# Patient Record
Sex: MALE | Race: WHITE | NOT HISPANIC OR LATINO | URBAN - METROPOLITAN AREA
[De-identification: names, ages, dates, MRNs, and addresses within clinical notes are randomized per-mention and may not be internally consistent; named-entity substitution may affect disease eponyms.]

---

## 2019-11-05 ENCOUNTER — OFFICE VISIT (OUTPATIENT)
Dept: UROLOGY | Facility: CLINIC | Age: 74
End: 2019-11-05
Payer: MEDICARE

## 2019-11-05 VITALS
HEART RATE: 69 BPM | BODY MASS INDEX: 26.51 KG/M2 | SYSTOLIC BLOOD PRESSURE: 119 MMHG | WEIGHT: 179 LBS | DIASTOLIC BLOOD PRESSURE: 60 MMHG | RESPIRATION RATE: 16 BRPM | HEIGHT: 69 IN

## 2019-11-05 DIAGNOSIS — N40.1 BPH WITH OBSTRUCTION/LOWER URINARY TRACT SYMPTOMS: ICD-10-CM

## 2019-11-05 DIAGNOSIS — N13.8 BPH WITH OBSTRUCTION/LOWER URINARY TRACT SYMPTOMS: ICD-10-CM

## 2019-11-05 LAB — PSA, DIAGNOSTIC: 0.18 NG/ML (ref 0.1–4)

## 2019-11-05 PROCEDURE — 99213 PR OFFICE/OUTPT VISIT, EST, LEVL III, 20-29 MIN: ICD-10-PCS | Mod: 25,S$GLB,, | Performed by: NURSE PRACTITIONER

## 2019-11-05 PROCEDURE — 99213 OFFICE O/P EST LOW 20 MIN: CPT | Mod: 25,S$GLB,, | Performed by: NURSE PRACTITIONER

## 2019-11-05 PROCEDURE — 81002 URINALYSIS NONAUTO W/O SCOPE: CPT | Mod: S$GLB,,, | Performed by: NURSE PRACTITIONER

## 2019-11-05 PROCEDURE — 81002 POCT URINE DIPSTICK WITHOUT MICROSCOPE: ICD-10-PCS | Mod: S$GLB,,, | Performed by: NURSE PRACTITIONER

## 2019-11-05 RX ORDER — CLOPIDOGREL BISULFATE 75 MG/1
75 TABLET ORAL DAILY
COMMUNITY

## 2019-11-05 RX ORDER — TIZANIDINE 4 MG/1
TABLET ORAL
Refills: 0 | COMMUNITY
Start: 2019-09-27

## 2019-11-05 RX ORDER — ASPIRIN 325 MG
325 TABLET ORAL NIGHTLY
COMMUNITY

## 2019-11-05 RX ORDER — LOSARTAN POTASSIUM 100 MG/1
160 TABLET ORAL DAILY
COMMUNITY

## 2019-11-05 RX ORDER — ALLOPURINOL 100 MG/1
100 TABLET ORAL DAILY
Refills: 5 | COMMUNITY
Start: 2019-08-20

## 2019-11-06 LAB
BILIRUB SERPL-MCNC: NORMAL MG/DL
BLOOD URINE, POC: NORMAL
COLOR, POC UA: YELLOW
GLUCOSE UR QL STRIP: NORMAL
KETONES UR QL STRIP: NORMAL
LEUKOCYTE ESTERASE URINE, POC: NORMAL
NITRITE, POC UA: NORMAL
PH, POC UA: 6
PROTEIN, POC: NORMAL
SPECIFIC GRAVITY, POC UA: 1.02
UROBILINOGEN, POC UA: 1

## 2020-11-02 ENCOUNTER — OFFICE VISIT (OUTPATIENT)
Dept: UROLOGY | Facility: CLINIC | Age: 75
End: 2020-11-02
Payer: MEDICARE

## 2020-11-02 VITALS
WEIGHT: 175 LBS | HEART RATE: 72 BPM | HEIGHT: 69 IN | DIASTOLIC BLOOD PRESSURE: 77 MMHG | SYSTOLIC BLOOD PRESSURE: 124 MMHG | BODY MASS INDEX: 25.92 KG/M2 | RESPIRATION RATE: 18 BRPM

## 2020-11-02 DIAGNOSIS — N13.8 BPH WITH OBSTRUCTION/LOWER URINARY TRACT SYMPTOMS: Primary | ICD-10-CM

## 2020-11-02 DIAGNOSIS — N40.1 BPH WITH OBSTRUCTION/LOWER URINARY TRACT SYMPTOMS: Primary | ICD-10-CM

## 2020-11-02 LAB — POC RESIDUAL URINE VOLUME: 0 ML (ref 0–100)

## 2020-11-02 PROCEDURE — 99213 OFFICE O/P EST LOW 20 MIN: CPT | Mod: S$GLB,,, | Performed by: NURSE PRACTITIONER

## 2020-11-02 PROCEDURE — 99213 PR OFFICE/OUTPT VISIT, EST, LEVL III, 20-29 MIN: ICD-10-PCS | Mod: S$GLB,,, | Performed by: NURSE PRACTITIONER

## 2020-11-02 PROCEDURE — 51798 US URINE CAPACITY MEASURE: CPT | Mod: S$GLB,,, | Performed by: NURSE PRACTITIONER

## 2020-11-02 PROCEDURE — 51798 POCT BLADDER SCAN: ICD-10-PCS | Mod: S$GLB,,, | Performed by: NURSE PRACTITIONER

## 2020-11-02 NOTE — PROGRESS NOTES
Subjective:       Patient ID: Kee Milligan is a 75 y.o. male.    Chief Complaint: Yrly and BPH with Obs      HPI: 75-year-old male known to service Dr. 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  Last PSA 2019 was 0.1.  He continues to void without complaint.  Denying dysuria, frequency, urgency, incontinence or gross hematuria.  No flank pain.  He has had no UTIs over the last 12 months.  Erections are not an issue.  No other urologic complaints       Past Medical History:   Past Medical History:   Diagnosis Date    HTN (hypertension)     Hyperlipemia     MI (myocardial infarction)     2001       Past Surgical Historical:   Past Surgical History:   Procedure Laterality Date    BACK SURGERY          Medications:   Medication List with Changes/Refills   Current Medications    ALLOPURINOL (ZYLOPRIM) 100 MG TABLET    Take 100 mg by mouth once daily.    ASPIRIN 325 MG TABLET    Take 325 mg by mouth every evening.    CLOPIDOGREL (PLAVIX) 75 MG TABLET    Take 75 mg by mouth once daily.    LOSARTAN (COZAAR) 100 MG TABLET    Take 160 mg by mouth once daily.    MULTIVITAMIN WITH MINERALS TABLET    Take 1 tablet by mouth once daily.    TIZANIDINE (ZANAFLEX) 4 MG TABLET    TK 1 T PO Q 8 H AS NEEDED. NTE 3 DOSES IN 24 H        Past Social History:   Social History     Socioeconomic History    Marital status:      Spouse name: Not on file    Number of children: Not on file    Years of education: Not on file    Highest education level: Not on file   Occupational History    Not on file   Social Needs    Financial resource strain: Not on file    Food insecurity     Worry: Not on file     Inability: Not on file    Transportation needs     Medical: Not on file     Non-medical: Not on file   Tobacco Use    Smoking status: Never Smoker    Smokeless tobacco: Never Used   Substance and Sexual Activity    Alcohol use: Yes     Comment: occasional    Drug use: Never    Sexual activity: Not on file   Lifestyle     Physical activity     Days per week: Not on file     Minutes per session: Not on file    Stress: Not on file   Relationships    Social connections     Talks on phone: Not on file     Gets together: Not on file     Attends Sikh service: Not on file     Active member of club or organization: Not on file     Attends meetings of clubs or organizations: Not on file     Relationship status: Not on file   Other Topics Concern    Not on file   Social History Narrative    Not on file       Allergies:   Review of patient's allergies indicates:   Allergen Reactions    Pcn [penicillins]         Family History: History reviewed. No pertinent family history.     Review of Systems:  Review of Systems   Constitutional: Negative for activity change and appetite change.   HENT: Negative for congestion and dental problem.    Respiratory: Negative for chest tightness and shortness of breath.    Cardiovascular: Negative for chest pain.   Gastrointestinal: Negative for abdominal distention and abdominal pain.   Genitourinary: Negative for decreased urine volume, difficulty urinating, discharge, dysuria, enuresis, flank pain, frequency, genital sores, hematuria, penile pain, penile swelling, scrotal swelling, testicular pain and urgency.   Musculoskeletal: Negative for back pain and neck pain.   Neurological: Negative for dizziness.   Hematological: Negative for adenopathy.   Psychiatric/Behavioral: Negative for agitation, behavioral problems and confusion.       Physical Exam:  Physical Exam  Constitutional:       Appearance: He is well-developed.   HENT:      Head: Normocephalic.   Eyes:      General: No scleral icterus.  Neck:      Musculoskeletal: Normal range of motion.   Pulmonary:      Effort: Pulmonary effort is normal.      Breath sounds: Normal breath sounds.   Abdominal:      General: There is no distension.      Palpations: Abdomen is soft.      Tenderness: There is no abdominal tenderness.      Hernia: No hernia  is present. There is no hernia in the right inguinal area or left inguinal area.   Genitourinary:     Penis: Normal.       Scrotum/Testes: Normal. Cremasteric reflex is present.      Comments: MARIANA--prostate is approximately 2 finger breath, slightly asymmetrical left greater than right with no nodule induration or tenderness  Skin:     General: Skin is warm and dry.   Neurological:      Mental Status: He is alert and oriented to person, place, and time.         Assessment/Plan:   BPH without obstruction--serum PSA today return to clinic 1 year pending stability of lab results   Problem List Items Addressed This Visit        Renal/    BPH with obstruction/lower urinary tract symptoms - Primary    Relevant Orders    POCT Bladder Scan    Prostate Specific Antigen, Diagnostic

## 2020-11-03 ENCOUNTER — TELEPHONE (OUTPATIENT)
Dept: UROLOGY | Facility: CLINIC | Age: 75
End: 2020-11-03

## 2020-11-04 ENCOUNTER — TELEPHONE (OUTPATIENT)
Dept: UROLOGY | Facility: CLINIC | Age: 75
End: 2020-11-04

## 2020-11-04 NOTE — TELEPHONE ENCOUNTER
Pt requesting copy of PSA results from 11-02-20 and 2014 mailed to him.  2014 records have already been requested from off site archived records.  Informed pt we will mail both results as soon as we get archived records.

## 2020-11-04 NOTE — TELEPHONE ENCOUNTER
----- Message from Antoinette Brandon sent at 11/4/2020 10:06 AM CST -----  Regarding: PSA report  Good morning,      Pt would like PSA report from yesterdays visit as well as his 2014 visit. Pt  would like a call back at [phone]        Thanks,  Antoinette Brandon

## 2020-11-04 NOTE — TELEPHONE ENCOUNTER
----- Message from Antoinette Brandon sent at 11/4/2020 10:06 AM CST -----  Regarding: PSA report  Good morning,      Pt would like PSA report from yesterdays visit as well as his 2014 visit. Pt  would like a call back at [phone]        Thanks,  Antoinette Brandon

## 2020-11-12 ENCOUNTER — TELEPHONE (OUTPATIENT)
Dept: UROLOGY | Facility: CLINIC | Age: 75
End: 2020-11-12

## 2021-01-13 ENCOUNTER — TELEPHONE (OUTPATIENT)
Dept: UROLOGY | Facility: CLINIC | Age: 76
End: 2021-01-13

## 2023-02-15 ENCOUNTER — TELEPHONE (OUTPATIENT)
Dept: GASTROENTEROLOGY | Facility: CLINIC | Age: 78
End: 2023-02-15
Payer: MEDICARE

## 2023-02-15 DIAGNOSIS — Z12.11 SCREENING FOR COLON CANCER: ICD-10-CM

## 2023-02-15 DIAGNOSIS — Z86.010 PERSONAL HISTORY OF COLONIC POLYPS: ICD-10-CM

## 2023-02-15 DIAGNOSIS — R13.10 DYSPHAGIA, UNSPECIFIED TYPE: Primary | ICD-10-CM

## 2023-02-16 VITALS — WEIGHT: 175 LBS | BODY MASS INDEX: 25.92 KG/M2 | HEIGHT: 69 IN

## 2023-02-16 RX ORDER — POLYETHYLENE GLYCOL 3350, SODIUM SULFATE ANHYDROUS, SODIUM BICARBONATE, SODIUM CHLORIDE, POTASSIUM CHLORIDE 236; 22.74; 6.74; 5.86; 2.97 G/4L; G/4L; G/4L; G/4L; G/4L
4 POWDER, FOR SOLUTION ORAL ONCE
Qty: 4000 ML | Refills: 0 | Status: SHIPPED | OUTPATIENT
Start: 2023-02-16 | End: 2023-02-16

## 2023-02-16 NOTE — TELEPHONE ENCOUNTER
Contacted patient to schedule EGD/Colonoscopy @ [facility] with Dr Rust on 4/20/23.  BMI is 25.84.  Patient is taking Plavix 75 mg.  Patient had a Angioplasty in 2001 and has Coronary Artery Disease.  CPAP setting is 3.  Patient has Medicare and is requesting Golytely.  I discussed the prep instructions and patient requested that I mail the instructions to him.      Patient was told to hold aspirin and Plavix 5-7 days before the procedure.  His Cardiologist is Dr Marcelo Nieves.    Daljit

## 2023-02-16 NOTE — TELEPHONE ENCOUNTER
"[facility]  [address] [place] [address]  Phone: [phone]  Fax: [phone]    History & Physical         Provider: Dr. Stephan Rust    Patient Name: Kee WHALEY (age):1945  77 y.o.           Gender: male   Phone: [phone]     Referring Physician: Adeel Whitaker     Vital Signs:   Height - 5' 9"  Weight - 175 lbs  BMI - 25.84     Plan: EGD/Colonoscopy    Encounter Diagnoses   Name Primary?    Dysphagia, unspecified type Yes    Personal history of colonic polyps     Screening for colon cancer            History:      Past Medical History:   Diagnosis Date    BMI 25.0-25.9,adult     Coronary artery disease     Dysphagia     Gout, unspecified     HTN (hypertension)     Hyperlipemia     MI (myocardial infarction)     Personal history of colonic polyps     Sleep apnea, unspecified     Setting 3      Past Surgical History:   Procedure Laterality Date    ANGIOPLASTY      BACK SURGERY      Lumbar x2    COLONOSCOPY W/ POLYPECTOMY  2021    CYST REMOVAL      From Back    PROSTATE SURGERY      UPPER GASTROINTESTINAL ENDOSCOPY  2021      Medication List with Changes/Refills   New Medications    POLYETHYLENE GLYCOL (GOLYTELY) 236-22.74-6.74 -5.86 GRAM SUSPENSION    Take 4,000 mLs (4 L total) by mouth once. for 1 dose   Current Medications    ALLOPURINOL (ZYLOPRIM) 100 MG TABLET    Take 100 mg by mouth once daily.    ASPIRIN 325 MG TABLET    Take 325 mg by mouth every evening.    CLOPIDOGREL (PLAVIX) 75 MG TABLET    Take 75 mg by mouth once daily.    LOSARTAN (COZAAR) 100 MG TABLET    Take 160 mg by mouth once daily.    MULTIVITAMIN WITH MINERALS TABLET    Take 1 tablet by mouth once daily.    TIZANIDINE (ZANAFLEX) 4 MG TABLET    TK 1 T PO Q 8 H AS NEEDED. NTE 3 DOSES IN 24 H      Review of patient's allergies indicates:   Allergen Reactions    Pcn [penicillins]     "   Family History   Problem Relation Age of Onset    Dementia Mother     Heart attack Father     Breast cancer Sister         In Remission    No Known Problems Maternal Grandmother     No Known Problems Maternal Grandfather     No Known Problems Paternal Grandmother     No Known Problems Paternal Grandfather       Social History     Tobacco Use    Smoking status: Never    Smokeless tobacco: Never   Substance Use Topics    Alcohol use: Yes     Comment: occasional    Drug use: Never        Physical Examination:     General Appearance:___________________________  HEENT: _____________________________________  Abdomen:____________________________________  Heart:________________________________________  Lungs:_______________________________________  Extremities:___________________________________  Skin:_________________________________________  Endocrine:____________________________________  Genitourinary:_________________________________  Neurological:__________________________________      Patient has been evaluated immediately prior to sedation and is medically cleared for endoscopy with IVCS as an ASA class: ______      Physician Signature: _________________________       Date: ________  Time: ________

## 2023-02-23 NOTE — TELEPHONE ENCOUNTER
Contacted patient and told patient we received the cardiac clearance from 's office and told him to hold the aspirin and Plavix 5-7 days prior to the procedure.  I also mailed the prep instructions again on 2/24/23 to patient stating to hold the aspirin and Plavix 5-7 days prior to procedure.  Daljit

## 2023-02-23 NOTE — TELEPHONE ENCOUNTER
Rec'd cardiac clearance, ECG, and last OV notes from 's office 2/15/23. Hold ASA/Plavix 5-7 days prior to the procedure.       Scanned into media and routed to RMKEVIN and KAELYN. di

## 2023-04-17 ENCOUNTER — TELEPHONE (OUTPATIENT)
Dept: GASTROENTEROLOGY | Facility: CLINIC | Age: 78
End: 2023-04-17
Payer: MEDICARE

## 2023-04-17 DIAGNOSIS — Z86.010 PERSONAL HISTORY OF COLONIC POLYPS: ICD-10-CM

## 2023-04-17 DIAGNOSIS — Z12.11 SCREENING FOR COLON CANCER: ICD-10-CM

## 2023-04-17 DIAGNOSIS — R13.10 DYSPHAGIA, UNSPECIFIED TYPE: Primary | ICD-10-CM

## 2023-04-17 NOTE — TELEPHONE ENCOUNTER
"[facility]  [address] [place] [address]  Phone: [phone]  Fax: [phone]    History & Physical         Provider: Dr. Stephan Rust    Patient Name: Kee WHALEY (age):1945  77 y.o.           Gender: male   Phone: [phone]     Referring Physician: Adeel Whitaker     Vital Signs:   Height - 5' 9"  Weight - 175 lbs  BMI -  25.84    Plan: EGD / Colonoscopy    Encounter Diagnoses   Name Primary?    Dysphagia, unspecified type Yes    Personal history of colonic polyps     Screening for colon cancer            History:      Past Medical History:   Diagnosis Date    BMI 25.0-25.9,adult     Coronary artery disease     Dysphagia     Gout, unspecified     HTN (hypertension)     Hyperlipemia     MI (myocardial infarction)     Personal history of colonic polyps     Sleep apnea, unspecified     Setting 3      Past Surgical History:   Procedure Laterality Date    ANGIOPLASTY      BACK SURGERY      Lumbar x2    COLONOSCOPY W/ POLYPECTOMY  2021    CYST REMOVAL      From Back    PROSTATE SURGERY      UPPER GASTROINTESTINAL ENDOSCOPY  2021      Medication List with Changes/Refills   Current Medications    ALLOPURINOL (ZYLOPRIM) 100 MG TABLET    Take 100 mg by mouth once daily.    ASPIRIN 325 MG TABLET    Take 325 mg by mouth every evening.    CLOPIDOGREL (PLAVIX) 75 MG TABLET    Take 75 mg by mouth once daily.    LOSARTAN (COZAAR) 100 MG TABLET    Take 160 mg by mouth once daily.    MULTIVITAMIN WITH MINERALS TABLET    Take 1 tablet by mouth once daily.    TIZANIDINE (ZANAFLEX) 4 MG TABLET    TK 1 T PO Q 8 H AS NEEDED. NTE 3 DOSES IN 24 H      Review of patient's allergies indicates:   Allergen Reactions    Pcn [penicillins]       Family History   Problem Relation Age of Onset    Dementia Mother     Heart attack Father     Breast cancer Sister         In Remission    No Known " Problems Maternal Grandmother     No Known Problems Maternal Grandfather     No Known Problems Paternal Grandmother     No Known Problems Paternal Grandfather       Social History     Tobacco Use    Smoking status: Never    Smokeless tobacco: Never   Substance Use Topics    Alcohol use: Yes     Comment: occasional    Drug use: Never        Physical Examination:     General Appearance:___________________________  HEENT: _____________________________________  Abdomen:____________________________________  Heart:________________________________________  Lungs:_______________________________________  Extremities:___________________________________  Skin:_________________________________________  Endocrine:____________________________________  Genitourinary:_________________________________  Neurological:__________________________________      Patient has been evaluated immediately prior to sedation and is medically cleared for endoscopy with IVCS as an ASA class: ______      Physician Signature: _________________________       Date: ________  Time: ________

## 2023-04-18 ENCOUNTER — TELEPHONE (OUTPATIENT)
Dept: GASTROENTEROLOGY | Facility: CLINIC | Age: 78
End: 2023-04-18
Payer: MEDICARE

## 2023-04-18 NOTE — TELEPHONE ENCOUNTER
----- Message from Stacey Rodriguez sent at 4/18/2023  2:40 PM CDT -----  Contact: Pt  Is calling rg the instructions for his procedure and states that they are confusing and can be reached at [phone]/pls call today/thanks/dbw

## 2023-04-18 NOTE — TELEPHONE ENCOUNTER
Returned call to pt and he understood the instructions as we went over the instructions on the phone

## 2023-04-20 ENCOUNTER — OUTSIDE PLACE OF SERVICE (OUTPATIENT)
Dept: GASTROENTEROLOGY | Facility: CLINIC | Age: 78
End: 2023-04-20

## 2023-04-20 ENCOUNTER — OUTSIDE PLACE OF SERVICE (OUTPATIENT)
Dept: GASTROENTEROLOGY | Facility: CLINIC | Age: 78
End: 2023-04-20
Payer: MEDICARE

## 2023-04-20 LAB
CRC RECOMMENDATION EXT: NORMAL
EGD FOLLOW UP EXTERNAL: NORMAL

## 2023-04-20 PROCEDURE — G0105 COLORECTAL SCRN; HI RISK IND: HCPCS | Mod: ,,, | Performed by: INTERNAL MEDICINE

## 2023-04-20 PROCEDURE — 43248 PR EGD, FLEX, W/DILATION OVER GUIDEWIRE: ICD-10-PCS | Mod: ,,, | Performed by: INTERNAL MEDICINE

## 2023-04-20 PROCEDURE — 43239 EGD BIOPSY SINGLE/MULTIPLE: CPT | Mod: 59,,, | Performed by: INTERNAL MEDICINE

## 2023-04-20 PROCEDURE — G0105 COLORECTAL SCRN; HI RISK IND: ICD-10-PCS | Mod: ,,, | Performed by: INTERNAL MEDICINE

## 2023-04-20 PROCEDURE — 43248 EGD GUIDE WIRE INSERTION: CPT | Mod: ,,, | Performed by: INTERNAL MEDICINE

## 2023-04-20 PROCEDURE — 43239 PR EGD, FLEX, W/BIOPSY, SGL/MULTI: ICD-10-PCS | Mod: 59,,, | Performed by: INTERNAL MEDICINE

## 2023-04-21 LAB — SPECIMEN TO PATHOLOGY: NORMAL

## 2023-04-25 ENCOUNTER — TELEPHONE (OUTPATIENT)
Dept: GASTROENTEROLOGY | Facility: CLINIC | Age: 78
End: 2023-04-25
Payer: MEDICARE

## 2023-04-25 NOTE — TELEPHONE ENCOUNTER
----- Message from Stephan Rust MD sent at 4/24/2023  7:05 AM CDT -----  Call with results, small amount of Barretts was detected-repeat EGD in 1 yr.

## 2023-04-25 NOTE — TELEPHONE ENCOUNTER
I spoke to patient and gave results of EGD.  He is C/O hoarseness and feels like SOB when he starts to talk since he has had the procedure.

## 2023-04-26 NOTE — TELEPHONE ENCOUNTER
I spoke to patient and he came in the clinic and I spoke to him and explained about the procedure.  He will call if not better in 10 days.

## 2023-04-28 ENCOUNTER — TELEPHONE (OUTPATIENT)
Dept: GASTROENTEROLOGY | Facility: CLINIC | Age: 78
End: 2023-04-28

## 2023-04-28 NOTE — TELEPHONE ENCOUNTER
----- Message from Dorothy Chavarria sent at 4/28/2023 10:52 AM CDT -----  Patient is calling in regards to would like results sent to his PCP...Please call him back at [phone]

## 2023-04-28 NOTE — TELEPHONE ENCOUNTER
I spoke to patient and he will come by next week to sign a KEILA.  He also is C/O since his EGD last week is having problems talking and when talking get SOB.  Has a little discomfort  eating also.  Wants to know what to do?

## 2023-05-01 ENCOUNTER — DOCUMENTATION ONLY (OUTPATIENT)
Dept: GASTROENTEROLOGY | Facility: CLINIC | Age: 78
End: 2023-05-01
Payer: MEDICARE

## 2023-05-01 ENCOUNTER — TELEPHONE (OUTPATIENT)
Dept: GASTROENTEROLOGY | Facility: CLINIC | Age: 78
End: 2023-05-01
Payer: MEDICARE

## 2023-05-01 NOTE — TELEPHONE ENCOUNTER
----- Message from Dorothy Chavarria sent at 5/1/2023 12:10 PM CDT -----  Patient is requesting a call back due to can not talk since having procedure..Please call him back [phone]

## 2023-05-01 NOTE — TELEPHONE ENCOUNTER
"Patient states he has been hoarse since his procedure and he "runs out of air trying to talk". Please advise  "

## 2023-05-02 NOTE — TELEPHONE ENCOUNTER
Patient called a second time complaining of shortness of breath and hoarseness since his procecdure.

## 2023-05-02 NOTE — TELEPHONE ENCOUNTER
----- Message from Dorothy Chavarria sent at 5/2/2023  4:40 PM CDT -----  Patient is still waiting for a call back..[phone]

## 2023-05-03 RX ORDER — PANTOPRAZOLE SODIUM 40 MG/1
40 TABLET, DELAYED RELEASE ORAL DAILY
COMMUNITY

## 2023-05-03 NOTE — TELEPHONE ENCOUNTER
Spoke to patient. He reports hoarseness and SOB when talking. Feels he can't get deep breath. I recommended he go to ER or at least urgent care so imaging can be done. Reminded him to let UC or ER know that he had dilation and should not have barium etc. He is taking pantoprazole 40 mg daily. I also reviewed Mendez's esophagus diagnosis with him.   MLC

## 2023-05-09 DIAGNOSIS — R06.02 SOB (SHORTNESS OF BREATH): Primary | ICD-10-CM

## 2023-05-10 ENCOUNTER — TELEPHONE (OUTPATIENT)
Dept: GASTROENTEROLOGY | Facility: CLINIC | Age: 78
End: 2023-05-10
Payer: MEDICARE

## 2023-05-10 NOTE — TELEPHONE ENCOUNTER
I spoke to patient and still having problems since procedure 4/20, states runs out of breathe when speaking and difficulty speaking. .  States [facility] a [facility], is in Chart.

## 2023-05-10 NOTE — TELEPHONE ENCOUNTER
----- Message from Galilea Thornton sent at 5/10/2023  3:58 PM CDT -----  Contact: self  Type - Patient Follow Up     Patient is still experiencing some post op concerns. Please reach out to patient @ [phone] - thanks!

## 2023-05-12 NOTE — TELEPHONE ENCOUNTER
Per Dr Davis, patient needs to be fit in next week for an appt for problems with speech and running out of breathe after talking.  He had a procedure done 2 weeks ago.

## 2023-05-19 NOTE — PROGRESS NOTES
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 Diagnoses of Shortness of breath and Personal history of colonic polyps were also pertinent to this visit.    PCP: Adeel Whitaker       HPI:  This is a 77 y.o. male here for follow up. After EGD 4/20/2023, he reported hoarseness and SOB. He states he is still having increase in SOB  and has to "grasp for air" when talking since EGD with dilation. Went to  5/4/2023 had CXR- mild elevation of right hemidiaphragm otherwise normal. He is on pantoprazole 40mg daily. Dysphagia resolved after dilation    4/20/2023 EGD - lower esophageal ring at EG jxn- 48fr dilation bx + for focal intestinal metaplasia, small HH- repeat in 1 year for Mendez Esophagus.    Review of Systems   Constitutional:  Negative for diaphoresis, fatigue, fever and unexpected weight change.   HENT:  Negative for hearing loss, mouth sores, postnasal drip, sore throat and trouble swallowing.    Eyes:  Negative for pain, discharge and eye dryness.   Respiratory:  Negative for apnea, cough, choking, chest tightness, shortness of breath and wheezing.    Cardiovascular:  Negative for chest pain, palpitations and leg swelling.   Gastrointestinal:  Negative for abdominal distention, abdominal pain, anal bleeding, blood in stool, change in bowel habit, constipation, diarrhea, nausea, rectal pain, vomiting, reflux, fecal incontinence and change in bowel habit.   Genitourinary:  Negative for bladder incontinence, dysuria and hematuria.   Musculoskeletal:  Positive for back pain and joint swelling. Negative for arthralgias.   Integumentary:  Negative for color change and rash.   Allergic/Immunologic: Negative for environmental allergies and food allergies.   Neurological:  Negative for seizures and headaches.   Hematological:  Negative for adenopathy. Does not bruise/bleed easily.      Past Medical History:   Diagnosis Date    Mendez's esophagus     BMI " "25.0-25.9,adult     Coronary artery disease     Dysphagia     Gout, unspecified     HTN (hypertension)     Hyperlipemia     MI (myocardial infarction) 2001    Personal history of colonic polyps     Sleep apnea, unspecified     Setting 3     Past Surgical History:   Procedure Laterality Date    ANGIOPLASTY  2001    BACK SURGERY      Lumbar x2    COLONOSCOPY W/ POLYPECTOMY  09/27/2021    CYST REMOVAL      From Back    PROSTATE SURGERY      UPPER GASTROINTESTINAL ENDOSCOPY  09/27/2021     Family History   Problem Relation Age of Onset    Dementia Mother     Heart attack Father     Breast cancer Sister         In Remission    No Known Problems Maternal Grandmother     No Known Problems Maternal Grandfather     No Known Problems Paternal Grandmother     No Known Problems Paternal Grandfather      Social History     Tobacco Use    Smoking status: Never    Smokeless tobacco: Never   Substance Use Topics    Alcohol use: Yes     Comment: occasional    Drug use: Never     Review of patient's allergies indicates:   Allergen Reactions    Pcn [penicillins]         Medication List with Changes/Refills   Current Medications    ALLOPURINOL (ZYLOPRIM) 100 MG TABLET    Take 100 mg by mouth once daily.    ASPIRIN 325 MG TABLET    Take 325 mg by mouth every evening.    CLOPIDOGREL (PLAVIX) 75 MG TABLET    Take 75 mg by mouth once daily.    LOSARTAN (COZAAR) 100 MG TABLET    Take 160 mg by mouth once daily.    MULTIVITAMIN WITH MINERALS TABLET    Take 1 tablet by mouth once daily.    PANTOPRAZOLE (PROTONIX) 40 MG TABLET    Take 40 mg by mouth once daily.    TIZANIDINE (ZANAFLEX) 4 MG TABLET    TK 1 T PO Q 8 H AS NEEDED. NTE 3 DOSES IN 24 H         Vital Signs:  /80 (BP Location: Left arm, Patient Position: Sitting, BP Method: Large (Automatic))   Pulse 74   Ht 5' 8" (1.727 m)   Wt 83.9 kg (185 lb)   SpO2 98%   BMI 28.13 kg/m²         Physical Exam  Constitutional:       General: He is not in acute distress.     Appearance: " Normal appearance. He is well-developed. He is not ill-appearing or toxic-appearing.   HENT:      Head: Normocephalic and atraumatic.      Nose: Nose normal.      Mouth/Throat:      Mouth: Mucous membranes are moist.      Pharynx: Oropharynx is clear. No oropharyngeal exudate or posterior oropharyngeal erythema.   Eyes:      General: Lids are normal. No scleral icterus.        Right eye: No discharge.         Left eye: No discharge.      Conjunctiva/sclera: Conjunctivae normal.   Cardiovascular:      Rate and Rhythm: Normal rate and regular rhythm.      Pulses:           Radial pulses are 2+ on the right side and 2+ on the left side.   Pulmonary:      Effort: Pulmonary effort is normal. No respiratory distress.      Breath sounds: No stridor. No wheezing.   Abdominal:      General: Bowel sounds are normal. There is no distension.      Palpations: Abdomen is soft. There is no fluid wave, hepatomegaly, splenomegaly or mass.      Tenderness: There is no abdominal tenderness. There is no guarding or rebound.   Musculoskeletal:      Cervical back: Full passive range of motion without pain.   Lymphadenopathy:      Cervical: No cervical adenopathy.   Skin:     General: Skin is warm and dry.      Capillary Refill: Capillary refill takes less than 2 seconds.      Coloration: Skin is not cyanotic, jaundiced or pale.   Neurological:      General: No focal deficit present.      Mental Status: He is alert and oriented to person, place, and time.   Psychiatric:         Mood and Affect: Mood normal.         Behavior: Behavior is cooperative.          All of the data above and below has been reviewed by myself and any further interpretations will be reflected in the assessment and plan.   The data includes review of external notes, and independent interpretation of lab results, procedures, x-rays, and imaging reports.      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 Repeat EGD in 1 year  SOB- will refer to pulmonary for workup. Chest X-ray- mild elevation of Right pam diagram-otherwise normal     Recommendations:    Refer to Pulmonary    Continue with Pantoprazole 40mg daily      Risks, benefits, and alternatives of medical management, any associated procedures, and/or treatment discussed with the patient. Patient given opportunity to ask questions and voices understanding. Patient has elected to proceed with the recommended care modalities as discussed.    Follow up in about 6 months (around 11/22/2023).    Order summary:  No orders of the defined types were placed in this encounter.       Instructed patient to notify my office if they have not been contacted within two weeks after any procedures, submitting any samples (biopsies, blood, stool, urine, etc.) or after any imaging (X-ray, CT, MRI, etc.).     Stephan Rust MD    This document may have been created using a voice recognition transcribing system. Incorrect words or phrases may have been missed during proofreading. Please interpret accordingly or contact me for clarification.

## 2023-05-22 ENCOUNTER — OFFICE VISIT (OUTPATIENT)
Dept: GASTROENTEROLOGY | Facility: CLINIC | Age: 78
End: 2023-05-22
Payer: MEDICARE

## 2023-05-22 VITALS
SYSTOLIC BLOOD PRESSURE: 125 MMHG | HEART RATE: 74 BPM | OXYGEN SATURATION: 98 % | WEIGHT: 185 LBS | BODY MASS INDEX: 28.04 KG/M2 | HEIGHT: 68 IN | DIASTOLIC BLOOD PRESSURE: 80 MMHG

## 2023-05-22 DIAGNOSIS — Z86.010 PERSONAL HISTORY OF COLONIC POLYPS: ICD-10-CM

## 2023-05-22 DIAGNOSIS — K22.70 BARRETT'S ESOPHAGUS WITHOUT DYSPLASIA: Primary | ICD-10-CM

## 2023-05-22 DIAGNOSIS — R06.02 SHORTNESS OF BREATH: ICD-10-CM

## 2023-05-22 PROCEDURE — 99213 PR OFFICE/OUTPT VISIT, EST, LEVL III, 20-29 MIN: ICD-10-PCS | Mod: S$GLB,,, | Performed by: INTERNAL MEDICINE

## 2023-05-22 PROCEDURE — 99213 OFFICE O/P EST LOW 20 MIN: CPT | Mod: S$GLB,,, | Performed by: INTERNAL MEDICINE

## 2023-05-22 NOTE — PATIENT INSTRUCTIONS
Refer to Pulmonary for shortness of breath    Continue with Pantoprazole 40mg daily    Please notify my office if you have not been contacted within two weeks after any procedures, submitting any samples (biopsies, blood, stool, urine, etc.) or after any imaging (X-ray, CT, MRI, etc.).

## 2023-05-25 ENCOUNTER — TELEPHONE (OUTPATIENT)
Dept: PULMONOLOGY | Facility: CLINIC | Age: 78
End: 2023-05-25
Payer: MEDICARE

## 2023-05-25 NOTE — TELEPHONE ENCOUNTER
Spoke with patient rescheduled to 052/30/2023. LB  ----- Message from Cecily Malcolm sent at 5/25/2023  3:30 PM CDT -----  Contact: self  Type:  Sooner Apoointment Request    Caller is requesting a sooner appointment.  Caller declined first available appointment listed below.  Caller will not accept being placed on the waitlist and is requesting a message be sent to doctor.  Name of Caller:Kee Milligan  When is the first available appointment?10/2023  Symptoms:lungs issue  Would the patient rather a call back or a response via [facility]? Call back  Best Call Back Number:[phone]  Additional Information: shortness of breath continuous

## 2023-05-30 ENCOUNTER — CLINICAL SUPPORT (OUTPATIENT)
Dept: PULMONOLOGY | Facility: CLINIC | Age: 78
End: 2023-05-30
Payer: MEDICARE

## 2023-05-30 ENCOUNTER — HOSPITAL ENCOUNTER (OUTPATIENT)
Dept: RADIOLOGY | Facility: CLINIC | Age: 78
Discharge: HOME | End: 2023-05-30
Attending: INTERNAL MEDICINE
Payer: MEDICARE

## 2023-05-30 ENCOUNTER — OFFICE VISIT (OUTPATIENT)
Dept: PULMONOLOGY | Facility: CLINIC | Age: 78
End: 2023-05-30
Payer: MEDICARE

## 2023-05-30 VITALS
SYSTOLIC BLOOD PRESSURE: 120 MMHG | OXYGEN SATURATION: 95 % | RESPIRATION RATE: 16 BRPM | BODY MASS INDEX: 26.52 KG/M2 | HEIGHT: 68 IN | DIASTOLIC BLOOD PRESSURE: 73 MMHG | WEIGHT: 175 LBS | HEART RATE: 79 BPM

## 2023-05-30 DIAGNOSIS — R06.02 SOB (SHORTNESS OF BREATH): ICD-10-CM

## 2023-05-30 DIAGNOSIS — R06.02 SHORTNESS OF BREATH: ICD-10-CM

## 2023-05-30 DIAGNOSIS — J98.6 DIAPHRAGM PARALYSIS: Primary | ICD-10-CM

## 2023-05-30 DIAGNOSIS — R06.02 SOB (SHORTNESS OF BREATH): Primary | ICD-10-CM

## 2023-05-30 DIAGNOSIS — R94.2 RESTRICTIVE PATTERN PRESENT ON PULMONARY FUNCTION TESTING: ICD-10-CM

## 2023-05-30 DIAGNOSIS — J98.6 ELEVATED DIAPHRAGM: Primary | ICD-10-CM

## 2023-05-30 PROCEDURE — 71046 XR CHEST PA AND LATERAL: ICD-10-PCS | Mod: 26,,, | Performed by: RADIOLOGY

## 2023-05-30 PROCEDURE — 71046 X-RAY EXAM CHEST 2 VIEWS: CPT | Mod: TC,,, | Performed by: INTERNAL MEDICINE

## 2023-05-30 PROCEDURE — 94726 PLETHYSMOGRAPHY LUNG VOLUMES: CPT | Mod: S$GLB,,, | Performed by: INTERNAL MEDICINE

## 2023-05-30 PROCEDURE — 94060 PR EVAL OF BRONCHOSPASM: ICD-10-PCS | Mod: S$GLB,,, | Performed by: INTERNAL MEDICINE

## 2023-05-30 PROCEDURE — 94060 EVALUATION OF WHEEZING: CPT | Mod: S$GLB,,, | Performed by: INTERNAL MEDICINE

## 2023-05-30 PROCEDURE — 94726 PULM FUNCT TST PLETHYSMOGRAP: ICD-10-PCS | Mod: S$GLB,,, | Performed by: INTERNAL MEDICINE

## 2023-05-30 PROCEDURE — 94729 PR C02/MEMBANE DIFFUSE CAPACITY: ICD-10-PCS | Mod: S$GLB,,, | Performed by: INTERNAL MEDICINE

## 2023-05-30 PROCEDURE — 99205 PR OFFICE/OUTPT VISIT, NEW, LEVL V, 60-74 MIN: ICD-10-PCS | Mod: 25,S$GLB,, | Performed by: INTERNAL MEDICINE

## 2023-05-30 PROCEDURE — 71046 XR CHEST PA AND LATERAL: ICD-10-PCS | Mod: TC,,, | Performed by: INTERNAL MEDICINE

## 2023-05-30 PROCEDURE — 94729 DIFFUSING CAPACITY: CPT | Mod: S$GLB,,, | Performed by: INTERNAL MEDICINE

## 2023-05-30 PROCEDURE — 99205 OFFICE O/P NEW HI 60 MIN: CPT | Mod: 25,S$GLB,, | Performed by: INTERNAL MEDICINE

## 2023-05-30 PROCEDURE — 71046 X-RAY EXAM CHEST 2 VIEWS: CPT | Mod: 26,,, | Performed by: RADIOLOGY

## 2023-05-30 RX ORDER — ATORVASTATIN CALCIUM 20 MG/1
20 TABLET, FILM COATED ORAL DAILY
COMMUNITY

## 2023-05-30 NOTE — PROGRESS NOTES
Subjective:    Patient Identification:   Patient ID: Kee Milligan is a 77 y.o. male.    Referring Provider:  Dr. Stephan Rust     Chief Complaint:  Shortness of Breath      History of Present Illness:    Kee Milligan is a 77 y.o. male who presents elevation of chronic shortness of breath.  He tells me that he can not get enough air sometimes even was speaking in long sentences.  He thought it was related to his esophagus and he asked Dr. Rust to stretch it but it did not help with the shortness of breath.  He is a lifelong nonsmoker but has exposure to cigarettes secondhand only when he worked in [employer] for 3-4 years.  He has a retired  but has no significant chemical or job-related exposure.  He does have occasional cough and sneezing typically 3-4 times in the morning.  No significant wheezing.  He does have some allergies and sometimes have productive cough.  He does have a history of coronary artery disease.  He does not walk much due to bad knee.  He denies any neck or upper back trauma or any history of surgeries on his chest cavity.  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  He has had 2 back surgeries in the lower back without any significant improvement in his leg pain or sciatica.  He does not give any personal history of tobacco use.  He does have obstructive sleep apnea and uses CPAP and sees Dr. Delbert Arora.  He has a chest x-ray done by Dr. Rust that showed right upper hemidiaphragm elevation.  He was sent to me for further evaluation.  His PFTs show mild restriction with severely reduced diffusion capacity out of proportion to physical and radiological examination.    Review of Systems:  Review of Systems   Constitutional:  Negative for fever, chills, weight loss, weight gain, activity change, appetite change, fatigue, night sweats and weakness.   HENT:  Negative for nosebleeds, postnasal drip, rhinorrhea, sinus pressure, sore throat, trouble swallowing, voice  change, congestion, ear pain and hearing loss.    Eyes:  Negative for redness and itching.   Respiratory:  Positive for cough, sputum production, shortness of breath and dyspnea on extertion. Negative for hemoptysis, choking, chest tightness, wheezing, orthopnea, previous hospitialization due to pulmonary problems, asthma nighttime symptoms, pleurisy, use of rescue inhaler and Paroxysmal Nocturnal Dyspnea.    Cardiovascular:  Negative for chest pain, palpitations and leg swelling.   Genitourinary:  Negative for difficulty urinating and hematuria.   Endocrine:  Negative for polydipsia, polyphagia, cold intolerance, heat intolerance and polyuria.    Musculoskeletal:  Negative for arthralgias, back pain, gait problem, joint swelling and myalgias.   Skin:  Negative for rash.   Gastrointestinal:  Negative for nausea, vomiting, abdominal pain, abdominal distention and acid reflux.   Neurological:  Negative for dizziness, syncope, weakness, light-headedness and headaches.   Hematological:  Negative for adenopathy. Does not bruise/bleed easily and no excessive bruising.   Psychiatric/Behavioral:  Negative for confusion and sleep disturbance. The patient is not nervous/anxious.        Allergies:   Review of patient's allergies indicates:   Allergen Reactions    Pcn [penicillins]        Medications:      Past Medical History:      Past Medical History:   Diagnosis Date    Mendez's esophagus     BMI 25.0-25.9,adult     Coronary artery disease     Dysphagia     Gout, unspecified     HTN (hypertension)     Hyperlipemia     MI (myocardial infarction) 2001    Mixed hyperlipidemia     Personal history of colonic polyps     Sleep apnea, unspecified     Setting 3       Family History:      Family History   Problem Relation Age of Onset    Dementia Mother     Heart attack Father     Breast cancer Sister         In Remission    No Known Problems Maternal Grandmother     No Known Problems Maternal Grandfather     No Known Problems  Paternal Grandmother     No Known Problems Paternal Grandfather         Social History:      Past Surgical History:   Procedure Laterality Date    ANGIOPLASTY  2001    BACK SURGERY      Lumbar x2    COLONOSCOPY W/ POLYPECTOMY  09/27/2021    CYST REMOVAL      From Back    PROSTATE SURGERY      UPPER GASTROINTESTINAL ENDOSCOPY  09/27/2021       Physical Exam:  Vitals:    05/30/23 1021   BP: 120/73   Pulse: 79   Resp: 16     Physical Exam   Constitutional: He is oriented to person, place, and time. He appears not cachectic. No distress.   HENT:   Head: Normocephalic.   Right Ear: External ear normal.   Left Ear: External ear normal.   Nose: Nose normal. No mucosal edema. No polyps.   Mouth/Throat: Oropharynx is clear and moist. Normal dentition. No oropharyngeal exudate. Mallampati Score: II.   Neck: No JVD present. No tracheal deviation present. No thyromegaly present.   Cardiovascular: Normal rate, regular rhythm, normal heart sounds and intact distal pulses. Exam reveals no gallop and no friction rub.   No murmur heard.  Pulmonary/Chest: Normal expansion, symmetric chest wall expansion, effort normal and breath sounds normal. No stridor. No respiratory distress. He has no decreased breath sounds. He has no wheezes. He has no rhonchi. He has no rales. Chest wall is not dull to percussion. He exhibits no tenderness. Negative for egophony. Negative for tactile fremitus.   Abdominal: Soft. Bowel sounds are normal. He exhibits no distension and no mass. There is no hepatosplenomegaly. There is no abdominal tenderness. There is no rebound and no guarding. No hernia.   Musculoskeletal:         General: No tenderness, deformity or edema. Normal range of motion.      Cervical back: Normal range of motion and neck supple.   Lymphadenopathy: No supraclavicular adenopathy is present.     He has no cervical adenopathy.     He has no axillary adenopathy.   Neurological: He is alert and oriented to person, place, and time. He has  normal reflexes. He displays normal reflexes. No cranial nerve deficit. He exhibits normal muscle tone.   Skin: Skin is warm and dry. No rash noted. He is not diaphoretic. No cyanosis or erythema. No pallor. Nails show no clubbing.   Psychiatric: He has a normal mood and affect. His behavior is normal. Judgment and thought content normal.     Accessory Clinical Data:  Chest x-ray:  Elevated right hemidiaphragm    CT scan:     PFTs:  Mild restriction with severely reduced diffusion capacity    6MWT:     TTE:    Lab data:    All radiographic imaging of the chest, PFT tracings/data, and 6MWT data have been independently reviewed and interpreted unless otherwise specified.     Assessment and Plan:        Problem List Items Addressed This Visit    None  Visit Diagnoses       Elevated diaphragm    -  Primary    Relevant Orders    FL Fluoro of Diaphragm    Shortness of breath        Relevant Orders    CT Chest Without Contrast    Restrictive pattern present on pulmonary function testing        Relevant Orders    CT Chest Without Contrast           Orders Placed This Encounter   Procedures    FL Fluoro of Diaphragm     Standing Status:   Future     Standing Expiration Date:   5/30/2024     Order Specific Question:   May the Radiologist modify the order per protocol to meet the clinical needs of the patient?     Answer:   Yes     Order Specific Question:   Is the patient allergic to iodine contrast?     Answer:   Unable to Assess     Order Specific Question:   Release to patient     Answer:   Immediate    CT Chest Without Contrast     Standing Status:   Future     Number of Occurrences:   1     Standing Expiration Date:   5/30/2024     Order Specific Question:   May the Radiologist modify the order per protocol to meet the clinical needs of the patient?     Answer:   Yes      Since there is restrictive pattern on his PFTs, will obtain high-resolution CT chest, however, I think this finding with out of proportionally reduced  diffusion capacity is due to right hemidiaphragm paralysis.  I will also obtain a sniff test to confirm hemidiaphragm paralysis.    CT chest was also tell us if there is any occult malignancy to explain phrenic nerve involvement.  We will refer the patient to CT surgery to consider diaphragm plication as patient seems to be significantly symptomatic with quality of life being affected.  More than 50% of 60 min time was spent face-to-face on counseling, in reviewing the imaging studies, reviewing notes from primary care provider, performing appropriate examination, counseling and educating the patient regarding the findings on PFTs and chest x-ray, ordering appropriate follow-up imaging studies, documenting clinical information in the electronic medical record and care coordination.    Follow up for After evaluation by Dr. Zavaleta.  Thank you very much for involving me in the care of this patient.  Please do not hesitate to reach me if you have any further questions or concerns.    This note is dictated on M*Modal word recognition program.  There are word recognition mistakes that are occasionally missed on review.

## 2023-06-08 ENCOUNTER — OUTSIDE PLACE OF SERVICE (OUTPATIENT)
Dept: INTERVENTIONAL RADIOLOGY/VASCULAR | Facility: CLINIC | Age: 78
End: 2023-06-08
Payer: MEDICARE

## 2023-06-08 PROCEDURE — 76000 FLUOROSCOPY <1 HR PHYS/QHP: CPT | Mod: 26,,, | Performed by: RADIOLOGY

## 2023-06-08 PROCEDURE — 76000 PR  FLUOROSCOPE EXAMINATION: ICD-10-PCS | Mod: 26,,, | Performed by: RADIOLOGY

## 2023-06-08 NOTE — PROGRESS NOTES
[facility] - Vascular Surgery  History and Physical      Subjective:     Chief Complaint/Reason for Clinic Visit: evaluation of possible R sided unilateral hemidiaphragmatic paralysis    History of Present Illness:  Mr. Kee Milligan is a 77 y.o. gentleman  with PMH HTN, hyperlipidemia, GERD, Mendez's esophagus, gout, non-smoker except exposure to cigarettes smoking due to working in the [employer] for 3-4 years, KARY uses CPAP, CAD- on Plavix followed by [place] NP/lance, who has been w persistent SOB, was suspected to have related to esophageal stricture, underwent esophageal dilatation w/o relief, but noticed to have elevated R diaphragm on CXR. He was referred to Dr. Lawler for evaluation where PFT was done showing mild restriction with severely reduced diffusion capacity out of proportion to physical and radiological examination. CT chest was ordered by Dr. Lawler to evaluate occult malignancy to explain phrenic nerve involvement causing R sided unilateral hemidiaphragmatic paralysis, leading to SOB. He reports having difficulty catching his breath or holding conversations with people due to the shortness of breath.    Sniff test 6/8/23 IMPRESSION: 1. Normal sniff test.    Ct chest 6/2/23 No acute or suspicious abnormalities. No specific findings of chronic interstitial lung disease.    Pulmonary function test 05/30/2023 FEV1 2.54 are 93% predicted.  DLCO 7.7 2R 32% predicted      Current Outpatient Medications on File Prior to Visit   Medication Sig Dispense Refill    allopurinol (ZYLOPRIM) 100 MG tablet Take 100 mg by mouth once daily.  5    aspirin 325 MG tablet Take 325 mg by mouth every evening.      atorvastatin (LIPITOR) 20 MG tablet Take 20 mg by mouth once daily.      clopidogrel (PLAVIX) 75 mg tablet Take 75 mg by mouth once daily.      losartan (COZAAR) 100 MG tablet Take 160 mg by mouth once daily.      multivitamin with minerals tablet Take 1 tablet by mouth once daily.      pantoprazole  (PROTONIX) 40 MG tablet Take 40 mg by mouth once daily.      tiZANidine (ZANAFLEX) 4 MG tablet TK 1 T PO Q 8 H AS NEEDED. NTE 3 DOSES IN 24 H  0     No current facility-administered medications on file prior to visit.       Review of patient's allergies indicates:  No Known Allergies  Past Medical History:   Diagnosis Date    Mendez's esophagus     BMI 25.0-25.9,adult     Coronary artery disease     Dysphagia     Gout, unspecified     HTN (hypertension)     Hyperlipemia     MI (myocardial infarction)     Mixed hyperlipidemia     Personal history of colonic polyps     Sleep apnea, unspecified     Setting 3       Past Surgical History:   Procedure Laterality Date    ANGIOPLASTY      BACK SURGERY      Lumbar x2    COLONOSCOPY W/ POLYPECTOMY  2021    CYST REMOVAL      From Back    PROSTATE SURGERY      UPPER GASTROINTESTINAL ENDOSCOPY  2021       Family History   Problem Relation Age of Onset    Dementia Mother     Heart attack Father     Breast cancer Sister         In Remission    No Known Problems Maternal Grandmother     No Known Problems Maternal Grandfather     No Known Problems Paternal Grandmother     No Known Problems Paternal Grandfather        Social History     Socioeconomic History    Marital status:     Number of children: 0   Tobacco Use    Smoking status: Never    Smokeless tobacco: Never    Tobacco comments:     Works in [employer] for 35 years.    Substance and Sexual Activity    Alcohol use: Yes     Comment: occasional    Drug use: Yes     Frequency: 14.0 times per week     Comment: cbd oil twice day     RADIOLOGY REPORT        PT NAME: EDEN COLÓN      [facility] IMAGING     : 1945 M 77             [address]    ACCT: [id number]                                              [facility] Rec #: [id number]                                        [id number]    [facility]             Procedure: CHEST THORAX WO CONT     REQUISITION #: [id number]      REPORT #: [id number]           DATE OF EXAM: 06/02/23    TIME OF EXAM: 1430       CHEST THORAX WO CONT        INDICATION: SHORTNESS OF BREATH        Comparison: 6/20/2012        TECHNIQUE: Automated exposure control was utilized for a dose reduction.    Noncontrast CT of the chest was performed. Interstitial lung disease   protocol was utilized.        Discussion:        There is mild bibasilar atelectasis or parenchymal scarring. There is no   specific findings of interstitial lung disease. No pulmonary mass or   consolidation is seen.        There is no evidence of mediastinal or hilar lymphadenopathy. The trachea   and major bronchi are patent. There is no evidence of pneumothorax or   pleural effusion. There is no suspicious bone lesion.        IMPRESSION:        No acute or suspicious abnormalities. No specific findings of chronic   interstitial lung disease.                        DICTATING PHYSICIAN:  KAYLA GILLIAM MD                   Date Dictated: 06/02/23 1805        Signed By:  KAYLA GILLIAM MD <Electronically signed by KAYLA GILLIAM MD   in OV>    Date Signed:  06/02/23 1808     CC: THERESE GALDAMEZ MD ; THERESE GALDAMEZ MD      ADMITTING PHYSICIAN:                                                                                                    ORDERING PHY: THERESE GALDAMEZ MD                                                                                                                                                      ATTENDING PHY: THERESE GALDAMEZ MD    Patient Status:  REG CLI    Admit Service Date: 06/02/23                Specimen Collected: 06/02/23 18:05 Last Resulted: 06/02/23 18:11             Review of Systems   Constitutional: Negative.  Negative for fever and weight loss.   HENT: Negative.  Negative for congestion, hearing loss and sore throat.    Eyes: Negative.  Negative for blurred vision and double vision.   Respiratory:  Positive for  shortness of breath. Negative for cough.    Cardiovascular: Negative.  Negative for chest pain, orthopnea and leg swelling.   Gastrointestinal: Negative.  Negative for abdominal pain, constipation, diarrhea, heartburn and vomiting.   Genitourinary: Negative.  Negative for dysuria, frequency and urgency.   Musculoskeletal: Negative.  Negative for myalgias and neck pain.   Skin: Negative.  Negative for itching and rash.   Neurological: Negative.  Negative for dizziness, tingling, focal weakness and headaches.   Psychiatric/Behavioral: Negative.  Negative for depression.      Objective:   There were no vitals taken for this visit.    Physical Exam  Constitutional:       General: He is not in acute distress.     Appearance: Normal appearance. He is normal weight. He is not ill-appearing, toxic-appearing or diaphoretic.   HENT:      Head: Normocephalic and atraumatic.      Nose: Nose normal.      Mouth/Throat:      Mouth: Mucous membranes are moist.   Eyes:      Extraocular Movements: Extraocular movements intact.      Conjunctiva/sclera: Conjunctivae normal.      Pupils: Pupils are equal, round, and reactive to light.   Cardiovascular:      Rate and Rhythm: Normal rate. Rhythm irregular.      Pulses: Normal pulses.      Heart sounds: Normal heart sounds. No murmur heard.    No gallop.   Pulmonary:      Effort: Pulmonary effort is normal. No respiratory distress.      Breath sounds: Normal breath sounds. No wheezing, rhonchi or rales.   Chest:      Chest wall: No tenderness.   Abdominal:      General: Abdomen is flat. Bowel sounds are normal. There is no distension.      Palpations: Abdomen is soft.      Tenderness: There is no abdominal tenderness. There is no right CVA tenderness or guarding.   Musculoskeletal:         General: No swelling, tenderness, deformity or signs of injury. Normal range of motion.      Cervical back: Normal range of motion.      Right lower leg: No edema.      Left lower leg: No edema.    Skin:     General: Skin is warm and dry.      Capillary Refill: Capillary refill takes more than 3 seconds.      Findings: No bruising or erythema.   Neurological:      General: No focal deficit present.      Mental Status: He is alert. Mental status is at baseline. He is disoriented.      Sensory: No sensory deficit.      Motor: No weakness.      Coordination: Coordination normal.      Gait: Gait normal.   Psychiatric:         Mood and Affect: Mood normal.       Assessment/Plan:     Mr. Kee Milligan is a 77 y.o. gentleman  with PMH HTN, hyperlipidemia, GERD, Mendez's esophagus, gout, non-smoker except exposure to cigarettes smoking due to working in the [employer] for 3-4 years, KARY uses CPAP, CAD- on Plavix presents for possible diaphragm plication     Plan: patient to think about Diaphragm Plication surgery. If he proceeds will need records from cardiology including recent echo and will need to get CT abd/pelvis and carotid u/s

## 2023-06-09 ENCOUNTER — OFFICE VISIT (OUTPATIENT)
Dept: CARDIOTHORACIC SURGERY | Facility: CLINIC | Age: 78
End: 2023-06-09
Payer: MEDICARE

## 2023-06-09 VITALS
RESPIRATION RATE: 18 BRPM | OXYGEN SATURATION: 96 % | DIASTOLIC BLOOD PRESSURE: 78 MMHG | WEIGHT: 180.38 LBS | HEART RATE: 71 BPM | SYSTOLIC BLOOD PRESSURE: 132 MMHG | BODY MASS INDEX: 27.43 KG/M2

## 2023-06-09 DIAGNOSIS — I77.89 OTHER SPECIFIED DISORDERS OF ARTERIES AND ARTERIOLES: ICD-10-CM

## 2023-06-09 DIAGNOSIS — Z01.810 PREPROCEDURAL CARDIOVASCULAR EXAMINATION: Primary | ICD-10-CM

## 2023-06-09 DIAGNOSIS — J98.6 DIAPHRAGM PARALYSIS: ICD-10-CM

## 2023-06-09 PROCEDURE — 99203 PR OFFICE/OUTPT VISIT, NEW, LEVL III, 30-44 MIN: ICD-10-PCS | Mod: S$GLB,,, | Performed by: THORACIC SURGERY (CARDIOTHORACIC VASCULAR SURGERY)

## 2023-06-09 PROCEDURE — 99203 OFFICE O/P NEW LOW 30 MIN: CPT | Mod: S$GLB,,, | Performed by: THORACIC SURGERY (CARDIOTHORACIC VASCULAR SURGERY)

## 2023-06-12 ENCOUNTER — TELEPHONE (OUTPATIENT)
Dept: CARDIOTHORACIC SURGERY | Facility: CLINIC | Age: 78
End: 2023-06-12
Payer: MEDICARE

## 2023-06-12 NOTE — TELEPHONE ENCOUNTER
Discussed with pt preop testing needed and has been ordered. Notified Lauri to let Dr Zavaleta know pt wants to proceed  ----- Message from Dianne Rodriguez sent at 6/12/2023  9:50 AM CDT -----  Regarding: Schedule Appt  Contact: Patient  Patient is requesting a call back to schedule an appt for a procedure discussed with physician during last appt on 06/09/2023   Please Assist     Patient can be reached at [phone]

## 2023-06-26 ENCOUNTER — TELEPHONE (OUTPATIENT)
Dept: CARDIOTHORACIC SURGERY | Facility: CLINIC | Age: 78
End: 2023-06-26
Payer: MEDICARE

## 2023-06-26 NOTE — TELEPHONE ENCOUNTER
Pt was scheduled and notified  ----- Message from Emmy Pearce sent at 6/26/2023  2:45 PM CDT -----  Type: Appointment Request     Name of Caller: Pt   When is the first available appointment? Do not have access  Reason for Visit:  Procedure   Best Call Back Number: [phone]  Additional Information:

## 2023-06-30 ENCOUNTER — OFFICE VISIT (OUTPATIENT)
Dept: CARDIOTHORACIC SURGERY | Facility: CLINIC | Age: 78
End: 2023-06-30
Payer: MEDICARE

## 2023-06-30 VITALS
OXYGEN SATURATION: 94 % | BODY MASS INDEX: 27.69 KG/M2 | RESPIRATION RATE: 18 BRPM | HEART RATE: 79 BPM | DIASTOLIC BLOOD PRESSURE: 80 MMHG | SYSTOLIC BLOOD PRESSURE: 132 MMHG | WEIGHT: 182.13 LBS

## 2023-06-30 DIAGNOSIS — J98.6 DIAPHRAGM PARALYSIS: Primary | ICD-10-CM

## 2023-06-30 PROCEDURE — 99214 OFFICE O/P EST MOD 30 MIN: CPT | Mod: S$GLB,,, | Performed by: THORACIC SURGERY (CARDIOTHORACIC VASCULAR SURGERY)

## 2023-06-30 PROCEDURE — 99214 PR OFFICE/OUTPT VISIT, EST, LEVL IV, 30-39 MIN: ICD-10-PCS | Mod: S$GLB,,, | Performed by: THORACIC SURGERY (CARDIOTHORACIC VASCULAR SURGERY)

## 2023-06-30 NOTE — PROGRESS NOTES
isaac [facility] - Vascular Surgery  History and Physical      Subjective:     Chief Complaint/Reason for Clinic Visit: evaluation of possible R sided unilateral hemidiaphragmatic paralysis    History of Present Illness:  Mr. Kee Milligan is a 77 y.o. gentleman  with PMH HTN, hyperlipidemia, GERD, Mendez's esophagus, gout, non-smoker except exposure to cigarettes smoking due to working in the [employer] for 3-4 years, KARY uses CPAP, CAD- on Plavix followed by [place] NP/lance, who has been w persistent SOB, was suspected to have related to esophageal stricture, underwent esophageal dilatation w/o relief, but noticed to have elevated R diaphragm on CXR. He was referred to Dr. Lawler for evaluation where PFT was done showing mild restriction with severely reduced diffusion capacity out of proportion to physical and radiological examination. CT chest was ordered by Dr. Lawler to evaluate occult malignancy to explain phrenic nerve involvement causing R sided unilateral hemidiaphragmatic paralysis, leading to SOB. He reports having difficulty catching his breath or holding conversations with people due to the shortness of breath.    Sniff test 6/8/23 IMPRESSION: 1. Normal sniff test.    Ct chest 6/2/23 No acute or suspicious abnormalities. No specific findings of chronic interstitial lung disease.    Pulmonary function test 05/30/2023 FEV1 2.54 are 93% predicted.  DLCO 7.7 2R 32% predicted    Clinic 7/5/23 Patient here to f/u on ordered test from last visit. Patient denies any changes in symptoms or new symptoms. He is interested in surgery    CUS on 6/21/23 showed  No evidence of hemodynamically significant cervical carotid artery stenosis bilaterally.    CT ABD/Pevlis IMPRESSION:    Acute diverticulitis of the mid sigmoid colon. No abscess but cannot exclude fistula to the bladder dome.    Constipation.    Numerous bilateral renal cysts with a slightly hyperdense exophytic cyst at the upper pole of the left  kidney.    3 mm stone lower pole left kidney. No ureteral stone or hydronephrosis.    Scattered micronodules in the left lung base less than 6 mm, likely related to previous granulomatous disease. Per the 2017 Fleischner Society recommendations, if the patient is at low risk for pulmonary malignancy, no lung micronodule followup is needed. If the patient is at high risk for pulmonary malignancy, a followup chest CT at 12 months should be considered.  If the micronodule is unchanged at that time, no further followup is needed.    Fat-containing left Bochdalek hernia.    Small fat-containing right inguinal hernia.    Moderate osteoarthritis of both hips.    Signer Name: Navin Albert MD  Signed: 6/20/2023 8:52 AM CDT        Current Outpatient Medications on File Prior to Visit   Medication Sig Dispense Refill    allopurinol (ZYLOPRIM) 100 MG tablet Take 100 mg by mouth once daily.  5    aspirin 325 MG tablet Take 325 mg by mouth every evening.      atorvastatin (LIPITOR) 20 MG tablet Take 20 mg by mouth once daily.      clopidogrel (PLAVIX) 75 mg tablet Take 75 mg by mouth once daily.      losartan (COZAAR) 100 MG tablet Take 160 mg by mouth once daily.      multivitamin with minerals tablet Take 1 tablet by mouth once daily.      pantoprazole (PROTONIX) 40 MG tablet Take 40 mg by mouth once daily.      tiZANidine (ZANAFLEX) 4 MG tablet TK 1 T PO Q 8 H AS NEEDED. NTE 3 DOSES IN 24 H  0     No current facility-administered medications on file prior to visit.       Review of patient's allergies indicates:  No Known Allergies  Past Medical History:   Diagnosis Date    Mendez's esophagus     BMI 25.0-25.9,adult     Coronary artery disease     Dysphagia     Gout, unspecified     HTN (hypertension)     Hyperlipemia     MI (myocardial infarction) 2001    Mixed hyperlipidemia     Personal history of colonic polyps     Sleep apnea, unspecified     Setting 3       Past Surgical History:   Procedure Laterality Date     ANGIOPLASTY      BACK SURGERY      Lumbar x2    COLONOSCOPY W/ POLYPECTOMY  2021    CYST REMOVAL      From Back    PROSTATE SURGERY      UPPER GASTROINTESTINAL ENDOSCOPY  2021       Family History   Problem Relation Age of Onset    Dementia Mother     Heart attack Father     Breast cancer Sister         In Remission    No Known Problems Maternal Grandmother     No Known Problems Maternal Grandfather     No Known Problems Paternal Grandmother     No Known Problems Paternal Grandfather        Social History     Socioeconomic History    Marital status:     Number of children: 0   Tobacco Use    Smoking status: Never    Smokeless tobacco: Never    Tobacco comments:     Works in [employer] for 35 years.    Substance and Sexual Activity    Alcohol use: Yes     Comment: occasional    Drug use: Yes     Frequency: 14.0 times per week     Comment: cbd oil twice day     RADIOLOGY REPORT        PT NAME: EDEN COLÓN      [facility] IMAGING     : 1945 M 77             [address]    ACCT: [id number]                                              [facility] Rec #: [id number]                                        [id number]    [facility]             Procedure: CHEST THORAX WO CONT    REQUISITION #: [id number]      REPORT #: [id number]           DATE OF EXAM: 23    TIME OF EXAM: 1430       CHEST THORAX WO CONT        INDICATION: SHORTNESS OF BREATH        Comparison: 2012        TECHNIQUE: Automated exposure control was utilized for a dose reduction.    Noncontrast CT of the chest was performed. Interstitial lung disease   protocol was utilized.        Discussion:        There is mild bibasilar atelectasis or parenchymal scarring. There is no   specific findings of interstitial lung disease. No pulmonary mass or   consolidation is seen.        There is no evidence of mediastinal or hilar lymphadenopathy. The trachea   and major bronchi are  patent. There is no evidence of pneumothorax or   pleural effusion. There is no suspicious bone lesion.        IMPRESSION:        No acute or suspicious abnormalities. No specific findings of chronic   interstitial lung disease.                        DICTATING PHYSICIAN:  KAYLA GILLIAM MD                   Date Dictated: 06/02/23 1805        Signed By:  KAYLA GILLIAM MD <Electronically signed by KAYLA GILLIAM MD   in OV>    Date Signed:  06/02/23 1808     CC: THERESE GALDAMEZ MD ; THERESE GALDAMEZ MD      ADMITTING PHYSICIAN:                                                                                                    ORDERING PHY: THERESE GALDAMEZ MD                                                                                                                                                      ATTENDING PHY: THERESE GALDAMEZ MD    Patient Status:  REG CLI    Admit Service Date: 06/02/23                Specimen Collected: 06/02/23 18:05 Last Resulted: 06/02/23 18:11             Review of Systems   Constitutional: Negative.  Negative for fever and weight loss.   HENT: Negative.  Negative for congestion, hearing loss and sore throat.    Eyes: Negative.  Negative for blurred vision and double vision.   Respiratory:  Positive for shortness of breath. Negative for cough.    Cardiovascular: Negative.  Negative for chest pain, orthopnea and leg swelling.   Gastrointestinal: Negative.  Negative for abdominal pain, blood in stool, constipation, diarrhea, heartburn, melena, nausea and vomiting.   Genitourinary: Negative.  Negative for dysuria, frequency and urgency.   Musculoskeletal: Negative.  Negative for myalgias and neck pain.   Skin: Negative.  Negative for itching and rash.   Neurological: Negative.  Negative for dizziness, tingling, focal weakness and headaches.   Psychiatric/Behavioral: Negative.  Negative for depression.      Objective:   /80   Pulse 79   Resp 18   Wt 82.6 kg (182 lb 1.6  oz)   SpO2 (!) 94%   BMI 27.69 kg/m²     Physical Exam  Constitutional:       General: He is not in acute distress.     Appearance: Normal appearance. He is normal weight. He is not ill-appearing, toxic-appearing or diaphoretic.   HENT:      Head: Normocephalic and atraumatic.      Nose: Nose normal.      Mouth/Throat:      Mouth: Mucous membranes are moist.   Eyes:      Extraocular Movements: Extraocular movements intact.      Conjunctiva/sclera: Conjunctivae normal.      Pupils: Pupils are equal, round, and reactive to light.   Cardiovascular:      Rate and Rhythm: Normal rate. Rhythm irregular.      Pulses: Normal pulses.      Heart sounds: Normal heart sounds. No murmur heard.    No gallop.   Pulmonary:      Effort: Pulmonary effort is normal. No respiratory distress.      Breath sounds: Normal breath sounds. No wheezing, rhonchi or rales.   Chest:      Chest wall: No tenderness.   Abdominal:      General: Abdomen is flat. Bowel sounds are normal. There is no distension.      Palpations: Abdomen is soft.      Tenderness: There is no abdominal tenderness. There is no right CVA tenderness or guarding.   Musculoskeletal:         General: No swelling, tenderness, deformity or signs of injury. Normal range of motion.      Cervical back: Normal range of motion.      Right lower leg: No edema.      Left lower leg: No edema.   Skin:     General: Skin is warm and dry.      Capillary Refill: Capillary refill takes more than 3 seconds.      Findings: No bruising or erythema.   Neurological:      General: No focal deficit present.      Mental Status: He is alert. Mental status is at baseline. He is disoriented.      Sensory: No sensory deficit.      Motor: No weakness.      Coordination: Coordination normal.      Gait: Gait normal.   Psychiatric:         Mood and Affect: Mood normal.       Assessment/Plan:     Mr. Kee Milligan is a 77 y.o. gentleman  with PMH HTN, hyperlipidemia, GERD, Mendez's esophagus, gout,  non-smoker except exposure to cigarettes smoking due to working in the [employer] for 3-4 years, KARY uses CPAP, CAD- on Plavix presents for possible diaphragm plication     Plan: Diaphragm Plication surgery on 7/13/23. Get records from cardiology including recent echo. Stop plavix 5 days prior to surgery.

## 2023-07-06 ENCOUNTER — CLINICAL SUPPORT (OUTPATIENT)
Dept: CARDIOTHORACIC SURGERY | Facility: CLINIC | Age: 78
End: 2023-07-06
Payer: MEDICARE

## 2023-07-06 DIAGNOSIS — J98.6 DIAPHRAGM PARALYSIS: Primary | ICD-10-CM

## 2023-07-06 LAB
ABS NRBC COUNT: 0 THOU/UL (ref 0–0.01)
ABSOLUTE BASOPHIL: 0 10*3/UL (ref 0–0.3)
ABSOLUTE EOSINOPHIL: 0.1 10*3/UL (ref 0–0.6)
ABSOLUTE IMMATURE GRAN: 0.03 THOU/UL (ref 0–0.03)
ABSOLUTE LYMPHOCYTE: 1.4 10*3/UL (ref 1.2–4)
ABSOLUTE MONOCYTE: 0.5 10*3/UL (ref 0.1–0.8)
ALBUMIN SERPL BCP-MCNC: 3.8 G/DL (ref 3.4–5)
ALP SERPL-CCNC: 79 U/L (ref 45–117)
ALT SERPL W P-5'-P-CCNC: 28 U/L (ref 16–61)
ANION GAP SERPL CALC-SCNC: 2 MMOL/L (ref 3–11)
APPEARANCE, UA: CLEAR
APTT PPP: 30.9 SEC (ref 25.8–38.6)
AST SERPL-CCNC: 24 U/L (ref 15–37)
BASOPHILS NFR BLD: 0.5 % (ref 0–3)
BILIRUB SERPL-MCNC: 0.6 MG/DL (ref 0.2–1)
BILIRUB UR QL STRIP: NEGATIVE
BUN SERPL-MCNC: 15 MG/DL (ref 7–18)
BUN/CREAT SERPL: 16.48 RATIO
CALCIUM SERPL-MCNC: 8.8 MG/DL (ref 8.5–10.1)
CHLORIDE SERPL-SCNC: 105 MMOL/L (ref 98–107)
CO2 SERPL-SCNC: 29 MMOL/L (ref 21–32)
COLOR UR: NORMAL
CREAT SERPL-MCNC: 0.91 MG/DL (ref 0.7–1.3)
EOSINOPHIL NFR BLD: 1.5 % (ref 0–6)
ERYTHROCYTE [DISTWIDTH] IN BLOOD BY AUTOMATED COUNT: 13.1 % (ref 0–15.5)
ESTIMATED AVG GLUCOSE: 122 MG/DL
GFR ESTIMATION: > 60
GLUCOSE (UA): NEGATIVE MG/DL
GLUCOSE SERPL-MCNC: 88 MG/DL (ref 74–106)
HBA1C MFR BLD: 5.6 % (ref 4.2–6.3)
HCT VFR BLD AUTO: 41.2 % (ref 42–52)
HGB BLD-MCNC: 13.6 G/DL (ref 14–18)
HGB UR QL STRIP: NEGATIVE
IMMATURE GRANULOCYTES: 0.5 % (ref 0–0.43)
INR PPP: 1 INR (ref 0.9–1.1)
KETONES UR QL STRIP: NEGATIVE MG/DL
LEUKOCYTE ESTERASE UR QL STRIP: NEGATIVE LEU/UL
LYMPHOCYTES NFR BLD: 22.7 % (ref 20–45)
MCH RBC QN AUTO: 30.6 PG (ref 27–32)
MCHC RBC AUTO-ENTMCNC: 33 % (ref 32–36)
MCV RBC AUTO: 92.6 FL (ref 80–97)
MONOCYTES NFR BLD: 7.4 % (ref 2–10)
NEUTROPHILS # BLD AUTO: 4.1 10*3/UL (ref 1.4–7)
NEUTROPHILS NFR BLD: 67.4 % (ref 50–80)
NITRITE UR QL STRIP: NEGATIVE
NUCLEATED RED BLOOD CELLS: 0 % (ref 0–0.2)
PH UR STRIP: 5.5 PH (ref 5–8)
PLATELETS: 174 10*3/UL (ref 130–400)
PMV BLD AUTO: 10.7 FL (ref 9.2–12.2)
POTASSIUM SERPL-SCNC: 4.3 MMOL/L (ref 3.5–5.1)
PROT SERPL-MCNC: 7.1 G/DL (ref 6.4–8.2)
PROT UR QL STRIP: NEGATIVE MG/DL
PROTHROMBIN TIME: 11.3 SEC (ref 10.2–12.9)
RBC # BLD AUTO: 4.45 10*6/UL (ref 4.7–6.1)
SODIUM BLD-SCNC: 136 MMOL/L (ref 131–143)
SP GR UR STRIP: 1.02 (ref 1–1.03)
TSH SERPL DL<=0.005 MIU/L-ACNC: 1.13 UIU/ML (ref 0.36–3.74)
UROBILINOGEN UR STRIP-ACNC: NORMAL MG/DL
WBC # BLD: 6.1 10*3/UL (ref 4.5–10)

## 2023-07-06 NOTE — PROGRESS NOTES
My Note     10:35 AM     Edit  Delete  Copy     Pt educated and consented. Verbalized understanding

## 2023-07-13 ENCOUNTER — OUTSIDE PLACE OF SERVICE (OUTPATIENT)
Dept: CARDIOTHORACIC SURGERY | Facility: CLINIC | Age: 78
End: 2023-07-13

## 2023-07-13 PROCEDURE — 39545 REVISION OF DIAPHRAGM: CPT | Mod: RT,,, | Performed by: THORACIC SURGERY (CARDIOTHORACIC VASCULAR SURGERY)

## 2023-07-13 PROCEDURE — 39545 PR REVISION OF DIAPHRAGM: ICD-10-PCS | Mod: RT,,, | Performed by: THORACIC SURGERY (CARDIOTHORACIC VASCULAR SURGERY)

## 2023-07-21 ENCOUNTER — OUTSIDE PLACE OF SERVICE (OUTPATIENT)
Dept: CARDIOTHORACIC SURGERY | Facility: CLINIC | Age: 78
End: 2023-07-21
Payer: MEDICARE

## 2023-07-21 PROCEDURE — 99024 POSTOP FOLLOW-UP VISIT: CPT | Mod: ,,, | Performed by: THORACIC SURGERY (CARDIOTHORACIC VASCULAR SURGERY)

## 2023-07-21 PROCEDURE — 99024 PR POST-OP FOLLOW-UP VISIT: ICD-10-PCS | Mod: ,,, | Performed by: THORACIC SURGERY (CARDIOTHORACIC VASCULAR SURGERY)

## 2023-07-24 ENCOUNTER — NURSE TRIAGE (OUTPATIENT)
Dept: ADMINISTRATIVE | Facility: CLINIC | Age: 78
End: 2023-07-24
Payer: MEDICARE

## 2023-07-25 ENCOUNTER — TELEPHONE (OUTPATIENT)
Dept: CARDIOTHORACIC SURGERY | Facility: CLINIC | Age: 78
End: 2023-07-25
Payer: MEDICARE

## 2023-07-25 NOTE — TELEPHONE ENCOUNTER
"Patient states he was discharged from the hospital on Friday, 7/21/23 post Lung procedure. Patient states his post discharge instruction and discharge instruction from Cardiologist state to hold his prescription for Lunesta, which is his prescription to aid sleep. Patient called to inquire if ok to restart Lunesta after being discontinued. Patient states c/o Insomnia since 7/21/23.    Care Advice given per Insomnia (Adult) Guideline. Patient advised to adhere to post-discharge instructions and continue hold of Lunesta. Patient states understanding of care advice and cites no additional concerns at this time.       Reason for Disposition   Difficulty falling to sleep or staying asleep    Additional Information   Negative: Difficulty breathing   Negative: Depression is suspected   Negative: Traumatic Brain Injury (TBI) is suspected   Negative: Suspected alcohol withdrawal or unhealthy use of alcohol (drinking too much)   Negative: Suspected substance use (drug use), addiction, or withdrawal   Negative: [1] Pain is causing insomnia AND [2] pain is not a chronic symptom (recurrent or ongoing AND present > 4 weeks)   Negative: [1] Insomnia persists > 1 week AND [2] no improvement after using Care Advice   Negative: Insomnia interferes with work or school   Negative: [1] Pain is causing insomnia AND [2] pain is a chronic symptom (recurrent or ongoing AND present > 4 weeks)   Negative: Requesting medication for sleep ("sleeping pill")   Negative: Awakened  by jerking leg movements   Negative: [1] Restless legs or unpleasant feeling in legs AND [2] causes insomnia   Negative: Loud snoring is an ongoing problem  (> 2 weeks)   Negative: Excessive daytime sleepiness is an ongoing problem  (> 2 weeks)   Negative: Insomnia is an ongoing problem (> 2 weeks)    Protocols used: Insomnia-A-AH    "

## 2023-07-27 ENCOUNTER — HOSPITAL ENCOUNTER (OUTPATIENT)
Dept: RADIOLOGY | Facility: CLINIC | Age: 78
Discharge: HOME | End: 2023-07-27
Attending: NURSE PRACTITIONER
Payer: MEDICARE

## 2023-07-27 ENCOUNTER — OFFICE VISIT (OUTPATIENT)
Dept: CARDIOTHORACIC SURGERY | Facility: CLINIC | Age: 78
End: 2023-07-27
Payer: MEDICARE

## 2023-07-27 VITALS
DIASTOLIC BLOOD PRESSURE: 78 MMHG | HEIGHT: 68 IN | BODY MASS INDEX: 25.9 KG/M2 | HEART RATE: 81 BPM | WEIGHT: 170.88 LBS | SYSTOLIC BLOOD PRESSURE: 125 MMHG | RESPIRATION RATE: 20 BRPM | OXYGEN SATURATION: 95 %

## 2023-07-27 DIAGNOSIS — Z98.890 STATUS POST THORACOTOMY: ICD-10-CM

## 2023-07-27 DIAGNOSIS — J98.6 DIAPHRAGM PARALYSIS: Primary | ICD-10-CM

## 2023-07-27 DIAGNOSIS — J98.6 DIAPHRAGM PARALYSIS: ICD-10-CM

## 2023-07-27 PROCEDURE — 71046 XR CHEST PA AND LATERAL: ICD-10-PCS | Mod: 26,,, | Performed by: RADIOLOGY

## 2023-07-27 PROCEDURE — 99499 NO LOS: ICD-10-PCS | Mod: S$GLB,,, | Performed by: NURSE PRACTITIONER

## 2023-07-27 PROCEDURE — 71046 X-RAY EXAM CHEST 2 VIEWS: CPT | Mod: 26,,, | Performed by: RADIOLOGY

## 2023-07-27 PROCEDURE — 99499 UNLISTED E&M SERVICE: CPT | Mod: S$GLB,,, | Performed by: NURSE PRACTITIONER

## 2023-08-08 NOTE — PROGRESS NOTES
isaac [facility] - Vascular Surgery  History and Physical      Subjective:     Chief Complaint/Reason for Clinic Visit: evaluation of possible R sided unilateral hemidiaphragmatic paralysis    History of Present Illness:  Mr. Kee Milligan is a 77 y.o. gentleman  with PMH HTN, hyperlipidemia, GERD, Mendez's esophagus, gout, non-smoker except exposure to cigarettes smoking due to working in the [employer] for 3-4 years, KARY uses CPAP, CAD- on Plavix followed by [place] NP/lance, who has been w persistent SOB, was suspected to have related to esophageal stricture, underwent esophageal dilatation w/o relief, but noticed to have elevated R diaphragm on CXR. He was referred to Dr. Lawler for evaluation where PFT was done showing mild restriction with severely reduced diffusion capacity out of proportion to physical and radiological examination. CT chest was ordered by Dr. Lawler to evaluate occult malignancy to explain phrenic nerve involvement causing R sided unilateral hemidiaphragmatic paralysis, leading to SOB. He reports having difficulty catching his breath or holding conversations with people due to the shortness of breath.    Sniff test 6/8/23 IMPRESSION: 1. Normal sniff test.    Ct chest 6/2/23 No acute or suspicious abnormalities. No specific findings of chronic interstitial lung disease.    Pulmonary function test 05/30/2023 FEV1 2.54 are 93% predicted.  DLCO 7.7 2R 32% predicted    Clinic 7/5/23 Patient here to f/u on ordered test from last visit. Patient denies any changes in symptoms or new symptoms. He is interested in surgery    CUS on 6/21/23 showed  No evidence of hemodynamically significant cervical carotid artery stenosis bilaterally.    CT ABD/Pevlis IMPRESSION:    Acute diverticulitis of the mid sigmoid colon. No abscess but cannot exclude fistula to the bladder dome.    Constipation.    Numerous bilateral renal cysts with a slightly hyperdense exophytic cyst at the upper pole of the left  kidney.    3 mm stone lower pole left kidney. No ureteral stone or hydronephrosis.    Scattered micronodules in the left lung base less than 6 mm, likely related to previous granulomatous disease. Per the 2017 Fleischner Society recommendations, if the patient is at low risk for pulmonary malignancy, no lung micronodule followup is needed. If the patient is at high risk for pulmonary malignancy, a followup chest CT at 12 months should be considered.  If the micronodule is unchanged at that time, no further followup is needed.    Fat-containing left Bochdalek hernia.    Small fat-containing right inguinal hernia.    Moderate osteoarthritis of both hips.    Signer Name: Navin Albert MD  Signed: 6/20/2023 8:52 AM CDT    Clinic 7/27/23-  S/P Diaphragmatic plication on 7/13/23 . He reports doing well since dc home. He has been attaining up to 1500 on IS. He continues to have some slept disturbance. He has been trying melatonin. He denies any s/s of infection at the surgical site. He denies any fever or chills.        Current Outpatient Medications on File Prior to Visit   Medication Sig Dispense Refill    acetaminophen (TYLENOL ORAL) Take by mouth.      allopurinol (ZYLOPRIM) 100 MG tablet Take 100 mg by mouth once daily.  5    aspirin 325 MG tablet Take 325 mg by mouth every evening.      atorvastatin (LIPITOR) 20 MG tablet Take 20 mg by mouth once daily.      clopidogrel (PLAVIX) 75 mg tablet Take 75 mg by mouth once daily.      losartan (COZAAR) 100 MG tablet Take 160 mg by mouth once daily.      multivitamin with minerals tablet Take 1 tablet by mouth once daily.      pantoprazole (PROTONIX) 40 MG tablet Take 40 mg by mouth once daily.      tiZANidine (ZANAFLEX) 4 MG tablet TK 1 T PO Q 8 H AS NEEDED. NTE 3 DOSES IN 24 H  0    traMADoL (ULTRAM) 50 mg tablet Take 1 tablet (50 mg total) by mouth every 8 (eight) hours as needed for Pain. 20 tablet 0     No current facility-administered medications on file prior to  visit.       Review of patient's allergies indicates:  No Known Allergies  Past Medical History:   Diagnosis Date    Mendez's esophagus     BMI 25.0-25.9,adult     Coronary artery disease     Dysphagia     Gout, unspecified     HTN (hypertension)     Hyperlipemia     MI (myocardial infarction)     Mixed hyperlipidemia     Personal history of colonic polyps     Sleep apnea, unspecified     Setting 3       Past Surgical History:   Procedure Laterality Date    ANGIOPLASTY      BACK SURGERY      Lumbar x2    COLONOSCOPY W/ POLYPECTOMY  2021    CYST REMOVAL      From Back    PROSTATE SURGERY      THORACOTOMY Right 2023    Right Thoracotomy, plication of the right hemidiaphragm with repiar of diaphragmatic eventration, intercostal block with exparel T6-T7-T8 nerves.    UPPER GASTROINTESTINAL ENDOSCOPY  2021       Family History   Problem Relation Age of Onset    Dementia Mother     Heart attack Father     Breast cancer Sister         In Remission    No Known Problems Maternal Grandmother     No Known Problems Maternal Grandfather     No Known Problems Paternal Grandmother     No Known Problems Paternal Grandfather        Social History     Socioeconomic History    Marital status:     Number of children: 0   Tobacco Use    Smoking status: Never    Smokeless tobacco: Never    Tobacco comments:     Works in [employer] for 35 years.    Substance and Sexual Activity    Alcohol use: Yes     Comment: occasional    Drug use: Yes     Frequency: 14.0 times per week     Comment: cbd oil twice day     RADIOLOGY REPORT        PT NAME: EDEN COLÓN      [facility] IMAGING     : 1945 M 77             [address]    ACCT: [id number]                                              [facility] Rec #: [id number]                                        [id number]    [facility]             Procedure: CHEST THORAX WO CONT    REQUISITION #: [id number]       REPORT #: [id number]           DATE OF EXAM: 06/02/23    TIME OF EXAM: 1430       CHEST THORAX WO CONT        INDICATION: SHORTNESS OF BREATH        Comparison: 6/20/2012        TECHNIQUE: Automated exposure control was utilized for a dose reduction.    Noncontrast CT of the chest was performed. Interstitial lung disease   protocol was utilized.        Discussion:        There is mild bibasilar atelectasis or parenchymal scarring. There is no   specific findings of interstitial lung disease. No pulmonary mass or   consolidation is seen.        There is no evidence of mediastinal or hilar lymphadenopathy. The trachea   and major bronchi are patent. There is no evidence of pneumothorax or   pleural effusion. There is no suspicious bone lesion.        IMPRESSION:        No acute or suspicious abnormalities. No specific findings of chronic   interstitial lung disease.                        DICTATING PHYSICIAN:  KAYLA GILLIAM MD                   Date Dictated: 06/02/23 1805        Signed By:  KAYLA GILLIAM MD <Electronically signed by KAYLA GILLIAM MD   in OV>    Date Signed:  06/02/23 1808     CC: THERESE GALDAMEZ MD ; THERESE GALDAMEZ MD      ADMITTING PHYSICIAN:                                                                                                    ORDERING PHY: THERESE GALDAMEZ MD                                                                                                                                                      ATTENDING PHY: THERESE GALDAMEZ MD    Patient Status:  REG CLI    Admit Service Date: 06/02/23                Specimen Collected: 06/02/23 18:05 Last Resulted: 06/02/23 18:11             Review of Systems   Constitutional: Negative.  Negative for fever and weight loss.   HENT: Negative.  Negative for congestion, hearing loss and sore throat.    Eyes: Negative.  Negative for blurred vision and double vision.   Respiratory:  Positive for shortness of breath. Negative for  "cough.         Shortness of breath improving   Cardiovascular: Negative.  Negative for chest pain, orthopnea and leg swelling.   Gastrointestinal: Negative.  Negative for abdominal pain, blood in stool, constipation, diarrhea, heartburn, melena, nausea and vomiting.   Genitourinary: Negative.  Negative for dysuria, frequency and urgency.   Musculoskeletal: Negative.  Negative for myalgias and neck pain.   Skin: Negative.  Negative for itching and rash.        Denies problems with surgical wound   Neurological: Negative.  Negative for dizziness, tingling, focal weakness and headaches.   Psychiatric/Behavioral: Negative.  Negative for depression.        Objective:   /78 (BP Location: Left arm, Patient Position: Sitting, BP Method: Medium (Automatic))   Pulse 81   Resp 20   Ht 5' 8" (1.727 m)   Wt 77.5 kg (170 lb 14.4 oz)   SpO2 95%   BMI 25.99 kg/m²     Physical Exam  Constitutional:       General: He is not in acute distress.     Appearance: Normal appearance. He is normal weight. He is not ill-appearing, toxic-appearing or diaphoretic.   HENT:      Head: Normocephalic and atraumatic.      Nose: Nose normal.      Mouth/Throat:      Mouth: Mucous membranes are moist.   Eyes:      Extraocular Movements: Extraocular movements intact.      Conjunctiva/sclera: Conjunctivae normal.      Pupils: Pupils are equal, round, and reactive to light.   Cardiovascular:      Rate and Rhythm: Normal rate and regular rhythm.      Pulses: Normal pulses.      Heart sounds: Normal heart sounds. No murmur heard.     No gallop.   Pulmonary:      Effort: Pulmonary effort is normal. No respiratory distress.      Breath sounds: Normal breath sounds. No wheezing, rhonchi or rales.   Chest:      Chest wall: No tenderness.   Abdominal:      General: Abdomen is flat. Bowel sounds are normal. There is no distension.      Palpations: Abdomen is soft.      Tenderness: There is no abdominal tenderness. There is no right CVA tenderness or " guarding.   Musculoskeletal:         General: No swelling, tenderness, deformity or signs of injury. Normal range of motion.      Cervical back: Normal range of motion.      Right lower leg: No edema.      Left lower leg: No edema.   Skin:     General: Skin is warm and dry.      Capillary Refill: Capillary refill takes more than 3 seconds.      Findings: No bruising or erythema.      Comments: Surgical incision well approximated with no erythema or drainage.    Neurological:      General: No focal deficit present.      Mental Status: He is alert. Mental status is at baseline. He is disoriented.      Sensory: No sensory deficit.      Motor: No weakness.      Coordination: Coordination normal.      Gait: Gait normal.   Psychiatric:         Mood and Affect: Mood normal.         Assessment/Plan:     Diaphragmatic Plication    Hx of Right sided hemidiaphragmatic paralysis    CAD    Hx of TURP    Arthritis    Sciatica    Pulm Nodule- (will need CT f/u in 12 months 6/20/24)    Plan: Doing well! Continue IS and progressive mobility. Continue melatonin for sleep. avoid lunesta as this may him extremely drowsy in hospital. Schedule 2 week f/u and PRN    Addendum : 7/28/23 Patient prescribed tramadol 50mg q8H prn severe pain # 20. LA  reviewed. He is tolerating this medication and getting reasonable relief. In the hospital norco made him drowsy.

## 2023-08-10 ENCOUNTER — OFFICE VISIT (OUTPATIENT)
Dept: CARDIOTHORACIC SURGERY | Facility: CLINIC | Age: 78
End: 2023-08-10
Payer: MEDICARE

## 2023-08-10 ENCOUNTER — HOSPITAL ENCOUNTER (OUTPATIENT)
Dept: RADIOLOGY | Facility: CLINIC | Age: 78
Discharge: HOME | End: 2023-08-10
Attending: NURSE PRACTITIONER
Payer: MEDICARE

## 2023-08-10 VITALS
HEART RATE: 76 BPM | OXYGEN SATURATION: 96 % | RESPIRATION RATE: 18 BRPM | BODY MASS INDEX: 26.32 KG/M2 | WEIGHT: 173.63 LBS | DIASTOLIC BLOOD PRESSURE: 79 MMHG | HEIGHT: 68 IN | SYSTOLIC BLOOD PRESSURE: 135 MMHG

## 2023-08-10 DIAGNOSIS — Z98.890 STATUS POST THORACOTOMY: Primary | ICD-10-CM

## 2023-08-10 DIAGNOSIS — Z98.890 STATUS POST THORACOTOMY: ICD-10-CM

## 2023-08-10 PROCEDURE — 71046 XR CHEST PA AND LATERAL: ICD-10-PCS | Mod: 26,,, | Performed by: RADIOLOGY

## 2023-08-10 PROCEDURE — 99499 NO LOS: ICD-10-PCS | Mod: S$GLB,,, | Performed by: NURSE PRACTITIONER

## 2023-08-10 PROCEDURE — 71046 X-RAY EXAM CHEST 2 VIEWS: CPT | Mod: 26,,, | Performed by: RADIOLOGY

## 2023-08-10 PROCEDURE — 99499 UNLISTED E&M SERVICE: CPT | Mod: S$GLB,,, | Performed by: NURSE PRACTITIONER

## 2023-08-10 RX ORDER — TRAMADOL HYDROCHLORIDE 50 MG/1
50 TABLET ORAL EVERY 8 HOURS PRN
Qty: 15 TABLET | Refills: 0 | Status: SHIPPED | OUTPATIENT
Start: 2023-08-10 | End: 2023-08-10 | Stop reason: SDUPTHER

## 2023-08-10 RX ORDER — TRAMADOL HYDROCHLORIDE 50 MG/1
50 TABLET ORAL EVERY 8 HOURS PRN
Qty: 15 TABLET | Refills: 0 | Status: SHIPPED | OUTPATIENT
Start: 2023-08-10

## 2023-08-11 NOTE — PROGRESS NOTES
isaac Almonte - Vascular Surgery  History and Physical      Subjective:     Chief Complaint/Reason for Clinic Visit: evaluation of possible R sided unilateral hemidiaphragmatic paralysis    History of Present Illness:  Mr. Kee Milligan is a 77 y.o. gentleman  with PMH HTN, hyperlipidemia, GERD, Mendez's esophagus, gout, non-smoker except exposure to cigarettes smoking due to working in the [employer] for 3-4 years, KARY uses CPAP, CAD- on Plavix followed by [facility] NP/lance, who has been w persistent SOB, was suspected to have related to esophageal stricture, underwent esophageal dilatation w/o relief, but noticed to have elevated R diaphragm on CXR. He was referred to Dr. Lawler for evaluation where PFT was done showing mild restriction with severely reduced diffusion capacity out of proportion to physical and radiological examination. CT chest was ordered by Dr. Lawler to evaluate occult malignancy to explain phrenic nerve involvement causing R sided unilateral hemidiaphragmatic paralysis, leading to SOB. He reports having difficulty catching his breath or holding conversations with people due to the shortness of breath.    Sniff test 6/8/23 IMPRESSION: 1. Normal sniff test.    Ct chest 6/2/23 No acute or suspicious abnormalities. No specific findings of chronic interstitial lung disease.    Pulmonary function test 05/30/2023 FEV1 2.54 are 93% predicted.  DLCO 7.7 2R 32% predicted    Clinic 7/5/23 Patient here to f/u on ordered test from last visit. Patient denies any changes in symptoms or new symptoms. He is interested in surgery    CUS on 6/21/23 showed  No evidence of hemodynamically significant cervical carotid artery stenosis bilaterally.    CT ABD/Pevlis IMPRESSION:    Acute diverticulitis of the mid sigmoid colon. No abscess but cannot exclude fistula to the bladder dome.    Constipation.    Numerous bilateral renal cysts with a slightly hyperdense exophytic cyst at the upper pole of the left  kidney.    3 mm stone lower pole left kidney. No ureteral stone or hydronephrosis.    Scattered micronodules in the left lung base less than 6 mm, likely related to previous granulomatous disease. Per the 2017 Fleischner Society recommendations, if the patient is at low risk for pulmonary malignancy, no lung micronodule followup is needed. If the patient is at high risk for pulmonary malignancy, a followup chest CT at 12 months should be considered.  If the micronodule is unchanged at that time, no further followup is needed.    Fat-containing left Bochdalek hernia.    Small fat-containing right inguinal hernia.    Moderate osteoarthritis of both hips.    Signer Name: Navin Albert MD  Signed: 6/20/2023 8:52 AM CDT    Clinic 7/27/23-  S/P Diaphragmatic plication on 7/13/23 . He reports doing well since dc home. He has been attaining up to 1500 on IS. He continues to have some slept disturbance. He has been trying melatonin. He denies any s/s of infection at the surgical site. He denies any fever or chills.    Clinic 8/11/23 Patient reports to be doing well. Still has some post thoracotomy pain intermittently which is improved with the tramadol. He denies any s/s of infection at the surgical site. He reports to be getting better each day.      Current Outpatient Medications on File Prior to Visit   Medication Sig Dispense Refill    acetaminophen (TYLENOL ORAL) Take by mouth.      allopurinol (ZYLOPRIM) 100 MG tablet Take 100 mg by mouth once daily.  5    aspirin 325 MG tablet Take 325 mg by mouth every evening.      atorvastatin (LIPITOR) 20 MG tablet Take 20 mg by mouth once daily.      clopidogrel (PLAVIX) 75 mg tablet Take 75 mg by mouth once daily.      losartan (COZAAR) 100 MG tablet Take 160 mg by mouth once daily.      multivitamin with minerals tablet Take 1 tablet by mouth once daily.      pantoprazole (PROTONIX) 40 MG tablet Take 40 mg by mouth once daily.      tiZANidine (ZANAFLEX) 4 MG tablet TK 1 T  PO Q 8 H AS NEEDED. NTE 3 DOSES IN 24 H  0     No current facility-administered medications on file prior to visit.       Review of patient's allergies indicates:  No Known Allergies  Past Medical History:   Diagnosis Date    Mendez's esophagus     BMI 25.0-25.9,adult     Coronary artery disease     Dysphagia     Gout, unspecified     HTN (hypertension)     Hyperlipemia     MI (myocardial infarction)     Mixed hyperlipidemia     Personal history of colonic polyps     Sleep apnea, unspecified     Setting 3       Past Surgical History:   Procedure Laterality Date    ANGIOPLASTY      BACK SURGERY      Lumbar x2    COLONOSCOPY W/ POLYPECTOMY  2021    CYST REMOVAL      From Back    PROSTATE SURGERY      THORACOTOMY Right 2023    Right Thoracotomy, plication of the right hemidiaphragm with repiar of diaphragmatic eventration, intercostal block with exparel T6-T7-T8 nerves.    UPPER GASTROINTESTINAL ENDOSCOPY  2021       Family History   Problem Relation Age of Onset    Dementia Mother     Heart attack Father     Breast cancer Sister         In Remission    No Known Problems Maternal Grandmother     No Known Problems Maternal Grandfather     No Known Problems Paternal Grandmother     No Known Problems Paternal Grandfather        Social History     Socioeconomic History    Marital status:     Number of children: 0   Tobacco Use    Smoking status: Never    Smokeless tobacco: Never    Tobacco comments:     Works in [employer] for 35 years.    Substance and Sexual Activity    Alcohol use: Yes     Comment: occasional    Drug use: Yes     Frequency: 14.0 times per week     Comment: cbd oil twice day     RADIOLOGY REPORT        PT NAME: ELDON,EDEN      [facility] IMAGING     : 1945 M 77             [address]    ACCT: [id number]                                              [facility] Rec #: [id number]                                        [id number]     [facility]             Procedure: CHEST THORAX WO CONT    REQUISITION #: [id number]      REPORT #: [id number]           DATE OF EXAM: 06/02/23    TIME OF EXAM: 1430       CHEST THORAX WO CONT        INDICATION: SHORTNESS OF BREATH        Comparison: 6/20/2012        TECHNIQUE: Automated exposure control was utilized for a dose reduction.    Noncontrast CT of the chest was performed. Interstitial lung disease   protocol was utilized.        Discussion:        There is mild bibasilar atelectasis or parenchymal scarring. There is no   specific findings of interstitial lung disease. No pulmonary mass or   consolidation is seen.        There is no evidence of mediastinal or hilar lymphadenopathy. The trachea   and major bronchi are patent. There is no evidence of pneumothorax or   pleural effusion. There is no suspicious bone lesion.        IMPRESSION:        No acute or suspicious abnormalities. No specific findings of chronic   interstitial lung disease.                        DICTATING PHYSICIAN:  KAYLA GILLIAM MD                   Date Dictated: 06/02/23 1805        Signed By:  KAYLA GILLIAM MD <Electronically signed by KAYLA GILLIAM MD   in OV>    Date Signed:  06/02/23 1808     CC: THERESE GALDAMEZ MD ; THERESE GALDAMEZ MD      ADMITTING PHYSICIAN:                                                                                                    ORDERING PHY: THERESE GALDAMEZ MD                                                                                                                                                      ATTENDING PHY: THERESE GALDAMEZ MD    Patient Status:  REG CLI    Admit Service Date: 06/02/23                Specimen Collected: 06/02/23 18:05 Last Resulted: 06/02/23 18:11             Review of Systems   Constitutional: Negative.  Negative for fever and weight loss.   HENT: Negative.  Negative for congestion, hearing loss and sore throat.    Eyes: Negative.   "Negative for blurred vision and double vision.   Respiratory:  Positive for shortness of breath. Negative for cough.         Shortness of breath improving   Cardiovascular: Negative.  Negative for chest pain, orthopnea and leg swelling.   Gastrointestinal: Negative.  Negative for abdominal pain, blood in stool, constipation, diarrhea, heartburn, melena, nausea and vomiting.   Genitourinary: Negative.  Negative for dysuria, frequency and urgency.   Musculoskeletal: Negative.  Negative for myalgias and neck pain.   Skin: Negative.  Negative for itching and rash.        Denies problems with surgical wound   Neurological: Negative.  Negative for dizziness, tingling, focal weakness and headaches.   Psychiatric/Behavioral: Negative.  Negative for depression.        Objective:   /79 (BP Location: Left arm, Patient Position: Sitting, BP Method: Medium (Automatic))   Pulse 76   Resp 18   Ht 5' 8" (1.727 m)   Wt 78.7 kg (173 lb 9.6 oz)   SpO2 96%   BMI 26.40 kg/m²     Physical Exam  Constitutional:       General: He is not in acute distress.     Appearance: Normal appearance. He is normal weight. He is not ill-appearing, toxic-appearing or diaphoretic.   HENT:      Head: Normocephalic and atraumatic.      Nose: Nose normal.      Mouth/Throat:      Mouth: Mucous membranes are moist.   Eyes:      Extraocular Movements: Extraocular movements intact.      Conjunctiva/sclera: Conjunctivae normal.      Pupils: Pupils are equal, round, and reactive to light.   Cardiovascular:      Rate and Rhythm: Normal rate and regular rhythm.      Pulses: Normal pulses.      Heart sounds: Normal heart sounds. No murmur heard.     No gallop.   Pulmonary:      Effort: Pulmonary effort is normal. No respiratory distress.      Breath sounds: Normal breath sounds. No wheezing, rhonchi or rales.   Chest:      Chest wall: No tenderness.   Abdominal:      General: Abdomen is flat. Bowel sounds are normal. There is no distension.      " Palpations: Abdomen is soft.      Tenderness: There is no abdominal tenderness. There is no right CVA tenderness or guarding.   Musculoskeletal:         General: No swelling, tenderness, deformity or signs of injury. Normal range of motion.      Cervical back: Normal range of motion.      Right lower leg: No edema.      Left lower leg: No edema.   Skin:     General: Skin is warm and dry.      Capillary Refill: Capillary refill takes more than 3 seconds.      Findings: No bruising or erythema.      Comments: Surgical incision well approximated with no erythema or drainage. Cleaned some dermabond from the incision   Neurological:      General: No focal deficit present.      Mental Status: He is alert and oriented to person, place, and time. Mental status is at baseline.      Sensory: No sensory deficit.      Motor: No weakness.      Coordination: Coordination normal.      Gait: Gait normal.   Psychiatric:         Mood and Affect: Mood normal.         Assessment/Plan:     Diaphragmatic Plication    Hx of Right sided hemidiaphragmatic paralysis    CAD    Hx of TURP    Arthritis    Sciatica    Pulm Nodule- (will need CT f/u in 12 months 6/2024)    Plan: Patient continues to improve. CXR looks good. Incision healing well. Pain is relieved with tramadol will prescribe tramadol # 15. Discussed pain may persist post thoracotomy for 3-6 months. If he needs continued pain medication will likely need to get from PCP.  Rec f/u CT chest for pulmonary nodule surveillance in 1 year. F/U CVT clinic PRN and if he decided for us to do surveillance.

## 2023-09-28 ENCOUNTER — DOCUMENT SCAN (OUTPATIENT)
Dept: HOME HEALTH SERVICES | Facility: HOSPITAL | Age: 78
End: 2023-09-28
Payer: MEDICARE

## 2023-10-31 ENCOUNTER — OUTSIDE PLACE OF SERVICE (OUTPATIENT)
Dept: CARDIOTHORACIC SURGERY | Facility: CLINIC | Age: 78
End: 2023-10-31

## 2023-10-31 PROCEDURE — 99222 1ST HOSP IP/OBS MODERATE 55: CPT | Mod: ,,, | Performed by: THORACIC SURGERY (CARDIOTHORACIC VASCULAR SURGERY)

## 2023-10-31 PROCEDURE — 99222 PR INITIAL HOSPITAL CARE,LEVL II: ICD-10-PCS | Mod: ,,, | Performed by: THORACIC SURGERY (CARDIOTHORACIC VASCULAR SURGERY)

## 2023-11-07 ENCOUNTER — OFFICE VISIT (OUTPATIENT)
Dept: CARDIOTHORACIC SURGERY | Facility: CLINIC | Age: 78
End: 2023-11-07
Payer: MEDICARE

## 2023-11-07 ENCOUNTER — HOSPITAL ENCOUNTER (OUTPATIENT)
Dept: RADIOLOGY | Facility: CLINIC | Age: 78
Discharge: HOME | End: 2023-11-07
Attending: NURSE PRACTITIONER
Payer: MEDICARE

## 2023-11-07 VITALS
WEIGHT: 173.5 LBS | RESPIRATION RATE: 20 BRPM | SYSTOLIC BLOOD PRESSURE: 125 MMHG | HEIGHT: 68 IN | DIASTOLIC BLOOD PRESSURE: 72 MMHG | OXYGEN SATURATION: 95 % | HEART RATE: 91 BPM | BODY MASS INDEX: 26.3 KG/M2

## 2023-11-07 DIAGNOSIS — R10.10 PAIN OF UPPER ABDOMEN: ICD-10-CM

## 2023-11-07 DIAGNOSIS — Z98.890 S/P THORACOTOMY: Primary | ICD-10-CM

## 2023-11-07 DIAGNOSIS — Z98.890 S/P THORACOTOMY: ICD-10-CM

## 2023-11-07 PROCEDURE — 71046 XR CHEST PA AND LATERAL: ICD-10-PCS | Mod: 26,,, | Performed by: RADIOLOGY

## 2023-11-07 PROCEDURE — 99499 UNLISTED E&M SERVICE: CPT | Mod: S$GLB,,, | Performed by: NURSE PRACTITIONER

## 2023-11-07 PROCEDURE — 71046 X-RAY EXAM CHEST 2 VIEWS: CPT | Mod: 26,,, | Performed by: RADIOLOGY

## 2023-11-07 PROCEDURE — 99499 NO LOS: ICD-10-PCS | Mod: S$GLB,,, | Performed by: NURSE PRACTITIONER

## 2023-11-07 NOTE — PROGRESS NOTES
isaac Almonte - Vascular Surgery  History and Physical      Subjective:     Chief Complaint/Reason for Clinic Visit: evaluation of possible R sided unilateral hemidiaphragmatic paralysis    History of Present Illness:  Mr. Kee Milligan is a 77 y.o. gentleman  with PMH HTN, hyperlipidemia, GERD, Mendez's esophagus, gout, non-smoker except exposure to cigarettes smoking due to working in the [employer] for 3-4 years, KARY uses CPAP, CAD- on Plavix followed by [facility] NP/lance, who has been w persistent SOB, was suspected to have related to esophageal stricture, underwent esophageal dilatation w/o relief, but noticed to have elevated R diaphragm on CXR. He was referred to Dr. Lawler for evaluation where PFT was done showing mild restriction with severely reduced diffusion capacity out of proportion to physical and radiological examination. CT chest was ordered by Dr. Lawler to evaluate occult malignancy to explain phrenic nerve involvement causing R sided unilateral hemidiaphragmatic paralysis, leading to SOB. He reports having difficulty catching his breath or holding conversations with people due to the shortness of breath.    Sniff test 6/8/23 IMPRESSION: 1. Normal sniff test.    Ct chest 6/2/23 No acute or suspicious abnormalities. No specific findings of chronic interstitial lung disease.    Pulmonary function test 05/30/2023 FEV1 2.54 are 93% predicted.  DLCO 7.7 2R 32% predicted    Clinic 7/5/23 Patient here to f/u on ordered test from last visit. Patient denies any changes in symptoms or new symptoms. He is interested in surgery    CUS on 6/21/23 showed  No evidence of hemodynamically significant cervical carotid artery stenosis bilaterally.    CT ABD/Pevlis IMPRESSION:    Acute diverticulitis of the mid sigmoid colon. No abscess but cannot exclude fistula to the bladder dome.    Constipation.    Numerous bilateral renal cysts with a slightly hyperdense exophytic cyst at the upper pole of the left  kidney.    3 mm stone lower pole left kidney. No ureteral stone or hydronephrosis.    Scattered micronodules in the left lung base less than 6 mm, likely related to previous granulomatous disease. Per the 2017 Fleischner Society recommendations, if the patient is at low risk for pulmonary malignancy, no lung micronodule followup is needed. If the patient is at high risk for pulmonary malignancy, a followup chest CT at 12 months should be considered.  If the micronodule is unchanged at that time, no further followup is needed.    Fat-containing left Bochdalek hernia.    Small fat-containing right inguinal hernia.    Moderate osteoarthritis of both hips.    Signer Name: Navin Albert MD  Signed: 6/20/2023 8:52 AM CDT    Clinic 7/27/23-  S/P Diaphragmatic plication on 7/13/23 . He reports doing well since dc home. He has been attaining up to 1500 on IS. He continues to have some slept disturbance. He has been trying melatonin. He denies any s/s of infection at the surgical site. He denies any fever or chills.    Clinic 8/11/23 Patient reports to be doing well. Still has some post thoracotomy pain intermittently which is improved with the tramadol. He denies any s/s of infection at the surgical site. He reports to be getting better each day.    Clinic 11/7/23 patient presents for one-week hospital follow up with chest x-ray and have CBC done this week.  Patient reports intermittent sharp pain about the incision.  Patient requests referral to ENT for hoarseness.  Hoarseness was present prior to surgery but wishes to be worked up further.      Current Outpatient Medications on File Prior to Visit   Medication Sig Dispense Refill    allopurinol (ZYLOPRIM) 100 MG tablet Take 100 mg by mouth once daily.  5    aspirin 325 MG tablet Take 325 mg by mouth every evening.      atorvastatin (LIPITOR) 20 MG tablet Take 20 mg by mouth once daily.      clopidogrel (PLAVIX) 75 mg tablet Take 75 mg by mouth once daily.       losartan (COZAAR) 100 MG tablet Take 160 mg by mouth once daily.      multivitamin with minerals tablet Take 1 tablet by mouth once daily.      pantoprazole (PROTONIX) 40 MG tablet Take 40 mg by mouth once daily.      tiZANidine (ZANAFLEX) 4 MG tablet TK 1 T PO Q 8 H AS NEEDED. NTE 3 DOSES IN 24 H  0    acetaminophen (TYLENOL ORAL) Take by mouth.      traMADoL (ULTRAM) 50 mg tablet Take 1 tablet (50 mg total) by mouth every 8 (eight) hours as needed for Pain. (Patient not taking: Reported on 11/7/2023) 15 tablet 0     No current facility-administered medications on file prior to visit.       Review of patient's allergies indicates:  No Known Allergies  Past Medical History:   Diagnosis Date    Mendez's esophagus     BMI 25.0-25.9,adult     Coronary artery disease     Dysphagia     Gout, unspecified     HTN (hypertension)     Hyperlipemia     MI (myocardial infarction) 2001    Mixed hyperlipidemia     Personal history of colonic polyps     Sleep apnea, unspecified     Setting 3       Past Surgical History:   Procedure Laterality Date    ANGIOPLASTY  2001    BACK SURGERY      Lumbar x2    COLONOSCOPY W/ POLYPECTOMY  09/27/2021    CYST REMOVAL      From Back    PROSTATE SURGERY      THORACOTOMY Right 07/13/2023    Right Thoracotomy, plication of the right hemidiaphragm with repiar of diaphragmatic eventration, intercostal block with exparel T6-T7-T8 nerves.    UPPER GASTROINTESTINAL ENDOSCOPY  09/27/2021       Family History   Problem Relation Age of Onset    Dementia Mother     Heart attack Father     Breast cancer Sister         In Remission    No Known Problems Maternal Grandmother     No Known Problems Maternal Grandfather     No Known Problems Paternal Grandmother     No Known Problems Paternal Grandfather        Social History     Socioeconomic History    Marital status:     Number of children: 0   Tobacco Use    Smoking status: Never    Smokeless tobacco: Never    Tobacco comments:     Works in [employer]  for 35 years.    Substance and Sexual Activity    Alcohol use: Yes     Comment: occasional    Drug use: Yes     Frequency: 14.0 times per week     Comment: cbd oil twice day     RADIOLOGY REPORT        PT NAME: EDEN COLÓN      [facility] IMAGING     : 1945 M 77             [address]    ACCT: [id number]                                              [place]    [facility] Rec #: [id number]                                        [id number]    [facility]             Procedure: CHEST THORAX WO CONT    REQUISITION #: [id number]      REPORT #: [id number]           DATE OF EXAM: 23    TIME OF EXAM: 1430       CHEST THORAX WO CONT        INDICATION: SHORTNESS OF BREATH        Comparison: 2012        TECHNIQUE: Automated exposure control was utilized for a dose reduction.    Noncontrast CT of the chest was performed. Interstitial lung disease   protocol was utilized.        Discussion:        There is mild bibasilar atelectasis or parenchymal scarring. There is no   specific findings of interstitial lung disease. No pulmonary mass or   consolidation is seen.        There is no evidence of mediastinal or hilar lymphadenopathy. The trachea   and major bronchi are patent. There is no evidence of pneumothorax or   pleural effusion. There is no suspicious bone lesion.        IMPRESSION:        No acute or suspicious abnormalities. No specific findings of chronic   interstitial lung disease.                        DICTATING PHYSICIAN:  KAYLA GILLIAM MD                   Date Dictated: 23        Signed By:  KAYLA GILLIAM MD <Electronically signed by KAYLA GILLIAM MD   in OV>    Date Signed:  23     CC: THERESE GALDAMEZ MD ; THERESE GALDAMEZ MD      ADMITTING PHYSICIAN:                                                                                                    ORDERING PHY: THERESE GALDAMEZ MD                                                "                                                                                                       ATTENDING PHY: THERESE GALDAMEZ MD    Patient Status:  REG CLI    Admit Service Date: 06/02/23                Specimen Collected: 06/02/23 18:05 Last Resulted: 06/02/23 18:11             Review of Systems   Constitutional: Negative.  Negative for fever and weight loss.   HENT: Negative.  Negative for congestion, hearing loss and sore throat.    Eyes: Negative.  Negative for blurred vision and double vision.   Respiratory:  Positive for shortness of breath. Negative for cough.         Shortness of breath improving   Cardiovascular: Negative.  Negative for chest pain, orthopnea and leg swelling.   Gastrointestinal: Negative.  Negative for abdominal pain, blood in stool, constipation, diarrhea, heartburn, melena, nausea and vomiting.   Genitourinary: Negative.  Negative for dysuria, frequency and urgency.   Musculoskeletal: Negative.  Negative for myalgias and neck pain.   Skin: Negative.  Negative for itching and rash.        Denies problems with surgical wound   Neurological: Negative.  Negative for dizziness, tingling, focal weakness and headaches.   Psychiatric/Behavioral: Negative.  Negative for depression.        Objective:   /72 (BP Location: Left arm, Patient Position: Sitting, BP Method: Medium (Automatic))   Pulse 91   Resp 20   Ht 5' 8" (1.727 m)   Wt 78.7 kg (173 lb 8 oz)   SpO2 95%   BMI 26.38 kg/m²     Physical Exam  Constitutional:       General: He is not in acute distress.     Appearance: Normal appearance. He is normal weight. He is not ill-appearing, toxic-appearing or diaphoretic.   HENT:      Head: Normocephalic and atraumatic.      Nose: Nose normal.      Mouth/Throat:      Mouth: Mucous membranes are moist.   Eyes:      Extraocular Movements: Extraocular movements intact.      Conjunctiva/sclera: Conjunctivae normal.      Pupils: Pupils are equal, round, and reactive to light. "   Cardiovascular:      Rate and Rhythm: Normal rate and regular rhythm.      Pulses: Normal pulses.      Heart sounds: Normal heart sounds. No murmur heard.     No gallop.   Pulmonary:      Effort: Pulmonary effort is normal. No respiratory distress.      Breath sounds: Normal breath sounds. No wheezing, rhonchi or rales.   Chest:      Chest wall: No tenderness.   Abdominal:      General: Abdomen is flat. Bowel sounds are normal. There is no distension.      Palpations: Abdomen is soft.      Tenderness: There is no abdominal tenderness. There is no right CVA tenderness or guarding.   Musculoskeletal:         General: No swelling, tenderness, deformity or signs of injury. Normal range of motion.      Cervical back: Normal range of motion.      Right lower leg: No edema.      Left lower leg: No edema.   Skin:     General: Skin is warm and dry.      Capillary Refill: Capillary refill takes more than 3 seconds.      Findings: No bruising or erythema.      Comments: Surgical incision well approximated with no erythema or drainage. Cleaned some dermabond from the incision   Neurological:      General: No focal deficit present.      Mental Status: He is alert and oriented to person, place, and time. Mental status is at baseline.      Sensory: No sensory deficit.      Motor: No weakness.      Coordination: Coordination normal.      Gait: Gait normal.   Psychiatric:         Mood and Affect: Mood normal.       Assessment/Plan:     Diaphragmatic Plication    Hx of Right sided hemidiaphragmatic paralysis    CAD    Hx of TURP    Arthritis    Sciatica    Pulm Nodule- (will need CT f/u in 12 months 6/2024)    Plan:  Discussed with the patient post thoracotomy pain can persist for 6 months to a year.  Patient declined referral to pain management.  Patient we will have CBC done on Thursday and call us when he has it done. chest x-ray today stable.

## 2023-12-06 ENCOUNTER — OFFICE VISIT (OUTPATIENT)
Dept: GASTROENTEROLOGY | Facility: CLINIC | Age: 78
End: 2023-12-06
Payer: MEDICARE

## 2023-12-06 ENCOUNTER — TELEPHONE (OUTPATIENT)
Dept: GASTROENTEROLOGY | Facility: CLINIC | Age: 78
End: 2023-12-06
Payer: MEDICARE

## 2023-12-06 VITALS
WEIGHT: 162 LBS | HEART RATE: 90 BPM | SYSTOLIC BLOOD PRESSURE: 138 MMHG | DIASTOLIC BLOOD PRESSURE: 81 MMHG | BODY MASS INDEX: 24.55 KG/M2 | HEIGHT: 68 IN | OXYGEN SATURATION: 97 %

## 2023-12-06 DIAGNOSIS — K57.20 COLONIC DIVERTICULAR ABSCESS: ICD-10-CM

## 2023-12-06 DIAGNOSIS — R10.31 RLQ ABDOMINAL PAIN: ICD-10-CM

## 2023-12-06 DIAGNOSIS — Z87.19 HISTORY OF DIVERTICULITIS: Primary | ICD-10-CM

## 2023-12-06 PROCEDURE — 99214 OFFICE O/P EST MOD 30 MIN: CPT | Mod: S$GLB,,, | Performed by: INTERNAL MEDICINE

## 2023-12-06 PROCEDURE — 99214 PR OFFICE/OUTPT VISIT, EST, LEVL IV, 30-39 MIN: ICD-10-PCS | Mod: S$GLB,,, | Performed by: INTERNAL MEDICINE

## 2023-12-06 NOTE — PROGRESS NOTES
Clinic Note    Reason for visit:  The primary encounter diagnosis was History of diverticulitis. Diagnoses of Colonic diverticular abscess and RLQ abdominal pain were also pertinent to this visit.    PCP: Adeel Whitaker       HPI:  This is a 78 y.o. male here for follow up. Patient with paracolic abscess. Patient admitted to [facility] 11/8/2023  for 10 days for diverticulitis with contained perforation and perisigmoid abscess. Finished antibiotics (Levaquin 500mg daily and Flagyl 500mg BID) Friday. He is seeing infectious disease doctor- recommended to coming off of antibiotics and repeat imaging in 4 weeks. Patient currently having RLQ pian. He did take 2 bottles of Mag Citrate yesterday and had good bowel movement. Pain did not change with good BM. Patient had thoracotomy 10/31/2023    Has hoarseness- seeing ENT.     12/4/2023 CBC- nl    CT A/P w/ 12/4/2023 small contained perforation in sigmoid colon similar to prior study. 2.7cm in length. 2nd fluid collection 3.6 x 1.9 cm   Ct A/P 11/15/2023 hazy stranding subjacent to the right diaphragmatic mesh is unchanged, decreased size of pericolonic collection. Now 2.8cm-- decreased 5.4cm  CT abd/pelvis IV 10/30/2023: moderate stool in colon. 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 Scattered stool throughout colon. No large polyps or masses noted. Patient will need 2 day prep on next colonoscopy. Repeat EGD/Colonoscopy in 1 yr.    Review of Systems   Constitutional:  Negative for diaphoresis, fatigue, fever and unexpected weight change.   HENT:  Negative for hearing loss, mouth sores, postnasal drip, sore throat and trouble swallowing.    Eyes:  Negative for pain, discharge and eye dryness.   Respiratory:  Positive for chest tightness. Negative for apnea, cough, choking, shortness of breath and wheezing.     Cardiovascular:  Negative for chest pain, palpitations and leg swelling.   Gastrointestinal:  Positive for abdominal distention, abdominal pain, change in bowel habit and constipation. Negative for anal bleeding, blood in stool, diarrhea, nausea, rectal pain, vomiting, reflux and fecal incontinence.   Genitourinary:  Negative for bladder incontinence, dysuria and hematuria.   Musculoskeletal:  Negative for arthralgias, back pain and joint swelling.   Integumentary:  Negative for color change and rash.   Allergic/Immunologic: Negative for environmental allergies and food allergies.   Neurological:  Negative for seizures and headaches.   Hematological:  Negative for adenopathy. Does not bruise/bleed easily.        Past Medical History:   Diagnosis Date    Mendez's esophagus     BMI 25.0-25.9,adult     Coronary artery disease     Diverticulitis     Dysphagia     Gout, unspecified     HTN (hypertension)     Hyperlipemia     MI (myocardial infarction) 2001    Mixed hyperlipidemia     Personal history of colonic polyps     Sleep apnea, unspecified     Setting 3     Past Surgical History:   Procedure Laterality Date    ANGIOPLASTY  2001    BACK SURGERY      Lumbar x2    COLONOSCOPY W/ POLYPECTOMY  09/27/2021    CYST REMOVAL      From Back    PROSTATE SURGERY      THORACOTOMY Right 07/13/2023    Right Thoracotomy, plication of the right hemidiaphragm with repiar of diaphragmatic eventration, intercostal block with exparel T6-T7-T8 nerves.    UPPER GASTROINTESTINAL ENDOSCOPY  09/27/2021     Family History   Problem Relation Age of Onset    Dementia Mother     Heart attack Father     Breast cancer Sister         In Remission    No Known Problems Maternal Grandmother     No Known Problems Maternal Grandfather     No Known Problems Paternal Grandmother     No Known Problems Paternal Grandfather     Colon cancer Neg Hx     Crohn's disease Neg Hx     Liver cancer Neg Hx     Liver disease Neg Hx     Rectal cancer Neg Hx      "Stomach cancer Neg Hx     Ulcerative colitis Neg Hx      Social History     Tobacco Use    Smoking status: Never    Smokeless tobacco: Never    Tobacco comments:     Works in [employer] for 35 years.    Substance Use Topics    Alcohol use: Yes     Comment: occasional    Drug use: Yes     Frequency: 14.0 times per week     Comment: cbd oil twice day     Review of patient's allergies indicates:   Allergen Reactions    Pcn [penicillins]         Medication List with Changes/Refills   Current Medications    ACETAMINOPHEN (TYLENOL ORAL)    Take by mouth.    ALLOPURINOL (ZYLOPRIM) 100 MG TABLET    Take 100 mg by mouth once daily.    ASPIRIN 325 MG TABLET    Take 325 mg by mouth every evening.    ATORVASTATIN (LIPITOR) 20 MG TABLET    Take 20 mg by mouth once daily.    CLOPIDOGREL (PLAVIX) 75 MG TABLET    Take 75 mg by mouth once daily.    LOSARTAN (COZAAR) 100 MG TABLET    Take 160 mg by mouth once daily.    MULTIVITAMIN WITH MINERALS TABLET    Take 1 tablet by mouth once daily.    PANTOPRAZOLE (PROTONIX) 40 MG TABLET    Take 40 mg by mouth once daily.    TIZANIDINE (ZANAFLEX) 4 MG TABLET    TK 1 T PO Q 8 H AS NEEDED. NTE 3 DOSES IN 24 H    TRAMADOL (ULTRAM) 50 MG TABLET    Take 1 tablet (50 mg total) by mouth every 8 (eight) hours as needed for Pain.         Vital Signs:  /81 (BP Location: Left arm, Patient Position: Sitting)   Pulse 90   Ht 5' 8" (1.727 m)   Wt 73.5 kg (162 lb)   SpO2 97%   BMI 24.63 kg/m²         Physical Exam  Constitutional:       General: He is not in acute distress.     Appearance: Normal appearance. He is well-developed. He is not ill-appearing or toxic-appearing.   HENT:      Head: Normocephalic and atraumatic.      Nose: Nose normal.      Mouth/Throat:      Mouth: Mucous membranes are moist.      Pharynx: Oropharynx is clear. No oropharyngeal exudate or posterior oropharyngeal erythema.   Eyes:      General: Lids are normal. No scleral icterus.        Right eye: No discharge.         Left " eye: No discharge.      Conjunctiva/sclera: Conjunctivae normal.   Cardiovascular:      Rate and Rhythm: Normal rate and regular rhythm.      Pulses:           Radial pulses are 2+ on the right side and 2+ on the left side.   Pulmonary:      Effort: Pulmonary effort is normal. No respiratory distress.      Breath sounds: No stridor. No wheezing.   Abdominal:      General: Bowel sounds are normal. There is no distension.      Palpations: Abdomen is soft. There is no fluid wave, hepatomegaly, splenomegaly or mass.      Tenderness: There is no abdominal tenderness. There is no guarding or rebound.   Musculoskeletal:      Cervical back: Full passive range of motion without pain.   Lymphadenopathy:      Cervical: No cervical adenopathy.   Skin:     General: Skin is warm and dry.      Capillary Refill: Capillary refill takes less than 2 seconds.      Coloration: Skin is not cyanotic, jaundiced or pale.   Neurological:      General: No focal deficit present.      Mental Status: He is alert and oriented to person, place, and time.   Psychiatric:         Mood and Affect: Mood normal.         Behavior: Behavior is cooperative.            All of the data above and below has been reviewed by myself and any further interpretations will be reflected in the assessment and plan.   The data includes review of external notes, and independent interpretation of lab results, procedures, x-rays, and imaging reports.      Assessment:  History of diverticulitis    Colonic diverticular abscess    RLQ abdominal pain         Will defer antibiotic to ID. He knows to go to ER if any increase in abdominal pain, fever. If any upcoming Ctscans would like ot be done locally.   Discussed upcoming halfway. Will need to follow up with new gastroenterologist.    Recommendations:    Continue to follow up with [facility] and surgery.  Recommend Dr. Berger, Dr. Sidhu, Dr. Cordon for new gastroenterologist.      Risks, benefits, and alternatives  of medical management, any associated procedures, and/or treatment discussed with the patient. Patient given opportunity to ask questions and voices understanding. Patient has elected to proceed with the recommended care modalities as discussed.    Instructed patient to notify my office if they have not been contacted within two weeks after any procedures, submitting any samples (biopsies, blood, stool, urine, etc.) or after any imaging (X-ray, CT, MRI, etc.).     Follow up if symptoms worsen or fail to improve.    Order summary:  No orders of the defined types were placed in this encounter.         This document may have been created using a voice recognition transcribing system. Incorrect words or phrases may have been missed during proofreading. Please interpret accordingly or contact me for clarification.     Stephan Rust MD

## 2023-12-06 NOTE — TELEPHONE ENCOUNTER
----- Message from Crystal Robertson sent at 12/6/2023  9:02 AM CST -----  Regarding: Requesting same day appt  Contact: self    ----- Message -----  From: Juana Guillermo  Sent: 12/6/2023   8:51 AM CST  To: [facility] Staff    Type:  Same Day Appointment Request  Caller is requesting a same day appointment.  Caller declined first available appointment listed below.    Name of Caller: Kee Milligan  When is the first available appointment? na  Symptoms:knots in his stomach that is very painful.  Best Call Back Number: [phone]  Additional Information: na  Dr Davis MRN:[id number]

## 2023-12-06 NOTE — PATIENT INSTRUCTIONS
Continue to follow up with [facility] and surgery.  Recommend Dr. Berger, Dr. Sidhu, Dr. Cordon for new gastroenterologist.

## 2024-02-23 ENCOUNTER — TELEPHONE (OUTPATIENT)
Dept: GASTROENTEROLOGY | Facility: CLINIC | Age: 79
End: 2024-02-23
Payer: MEDICARE

## 2024-02-23 NOTE — TELEPHONE ENCOUNTER
----- Message from Juana Guillermo sent at 2/23/2024  8:53 AM CST -----  Contact: self  Type: Staff Message    Caller: Kee Milligan    Call Back Number: [phone]    Nature of the Call: #pt was last seen by Dr Davis. He states he has diverticulitis and needing to talk about his next step.  Additional Information: na

## 2024-02-23 NOTE — TELEPHONE ENCOUNTER
Patient said he has been having issues. He was advised that NBP's next available isn't until January 2025. Patient voiced understanding. - dmp

## 2024-06-10 DIAGNOSIS — Z98.890 S/P THORACOTOMY: ICD-10-CM

## 2024-06-10 DIAGNOSIS — R91.1 PULMONARY NODULE: Primary | ICD-10-CM

## 2024-06-14 ENCOUNTER — OFFICE VISIT (OUTPATIENT)
Dept: CARDIOTHORACIC SURGERY | Facility: CLINIC | Age: 79
End: 2024-06-14
Payer: MEDICARE

## 2024-06-14 ENCOUNTER — HOSPITAL ENCOUNTER (OUTPATIENT)
Dept: RADIOLOGY | Facility: CLINIC | Age: 79
Discharge: HOME | End: 2024-06-14
Attending: THORACIC SURGERY (CARDIOTHORACIC VASCULAR SURGERY)
Payer: MEDICARE

## 2024-06-14 VITALS
RESPIRATION RATE: 20 BRPM | HEIGHT: 68 IN | DIASTOLIC BLOOD PRESSURE: 81 MMHG | SYSTOLIC BLOOD PRESSURE: 134 MMHG | WEIGHT: 174.13 LBS | OXYGEN SATURATION: 95 % | HEART RATE: 75 BPM | BODY MASS INDEX: 26.39 KG/M2

## 2024-06-14 DIAGNOSIS — R06.00 DYSPNEA: ICD-10-CM

## 2024-06-14 DIAGNOSIS — R06.00 DYSPNEA: Primary | ICD-10-CM

## 2024-06-14 PROCEDURE — 71046 X-RAY EXAM CHEST 2 VIEWS: CPT | Mod: 26,,, | Performed by: RADIOLOGY

## 2024-06-14 PROCEDURE — 71046 X-RAY EXAM CHEST 2 VIEWS: CPT | Mod: TC,,, | Performed by: THORACIC SURGERY (CARDIOTHORACIC VASCULAR SURGERY)

## 2024-06-14 PROCEDURE — 99213 OFFICE O/P EST LOW 20 MIN: CPT | Mod: S$GLB,,, | Performed by: THORACIC SURGERY (CARDIOTHORACIC VASCULAR SURGERY)

## 2024-06-17 NOTE — PROGRESS NOTES
[facility] - Vascular Surgery  History and Physical      Subjective:     Chief Complaint/Reason for Clinic Visit: evaluation of possible R sided unilateral hemidiaphragmatic paralysis    History of Present Illness:  Mr. Kee Milligan is a 77 y.o. gentleman  with PMH HTN, hyperlipidemia, GERD, Mendez's esophagus, gout, non-smoker except exposure to cigarettes smoking due to working in the [employer] for 3-4 years, KARY uses CPAP, CAD- on Plavix followed by [place] NP/lance, who has been w persistent SOB, was suspected to have related to esophageal stricture, underwent esophageal dilatation w/o relief, but noticed to have elevated R diaphragm on CXR. He was referred to Dr. Lawler for evaluation where PFT was done showing mild restriction with severely reduced diffusion capacity out of proportion to physical and radiological examination. CT chest was ordered by Dr. Lawler to evaluate occult malignancy to explain phrenic nerve involvement causing R sided unilateral hemidiaphragmatic paralysis, leading to SOB. He reports having difficulty catching his breath or holding conversations with people due to the shortness of breath.    Sniff test 6/8/23 IMPRESSION: 1. Normal sniff test.    Ct chest 6/2/23 No acute or suspicious abnormalities. No specific findings of chronic interstitial lung disease.    Pulmonary function test 05/30/2023 FEV1 2.54 are 93% predicted.  DLCO 7.7 2R 32% predicted    Clinic 7/5/23 Patient here to f/u on ordered test from last visit. Patient denies any changes in symptoms or new symptoms. He is interested in surgery    CUS on 6/21/23 showed  No evidence of hemodynamically significant cervical carotid artery stenosis bilaterally.    CT ABD/[place] IMPRESSION:    Acute diverticulitis of the mid sigmoid colon. No abscess but cannot exclude fistula to the bladder dome.    Constipation.    Numerous bilateral renal cysts with a slightly hyperdense exophytic cyst at the upper pole of the left  kidney.    3 mm stone lower pole left kidney. No ureteral stone or hydronephrosis.    Scattered micronodules in the left lung base less than 6 mm, likely related to previous granulomatous disease. Per the 2017 Fleischner Society recommendations, if the patient is at low risk for pulmonary malignancy, no lung micronodule followup is needed. If the patient is at high risk for pulmonary malignancy, a followup chest CT at 12 months should be considered.  If the micronodule is unchanged at that time, no further followup is needed.    Fat-containing left Bochdalek hernia.    Small fat-containing right inguinal hernia.    Moderate osteoarthritis of both hips.    Signer Name: Navin Albert MD  Signed: 6/20/2023 8:52 AM CDT    Clinic 7/27/23-  S/P Diaphragmatic plication on 7/13/23 . He reports doing well since dc home. He has been attaining up to 1500 on IS. He continues to have some slept disturbance. He has been trying melatonin. He denies any s/s of infection at the surgical site. He denies any fever or chills.    Clinic 8/11/23 Patient reports to be doing well. Still has some post thoracotomy pain intermittently which is improved with the tramadol. He denies any s/s of infection at the surgical site. He reports to be getting better each day.    Clinic 11/7/23 patient presents for one-week hospital follow up with chest x-ray and have CBC done this week.  Patient reports intermittent sharp pain about the incision.  Patient requests referral to ENT for hoarseness.  Hoarseness was present prior to surgery but wishes to be worked up further.      CLINIC 6/14/24 Patient presents as a new patient to see Dr. Suarez. He had a CT performed prior to the visit. He reports RL abdominal pain. He has scheduled botox injections with ent. He has been followed by MD diaz for diverticulitis. He deneis any chest pain but endorses some chronic shortness of breath.     Current Outpatient Medications on File Prior to Visit    Medication Sig Dispense Refill    acetaminophen (TYLENOL ORAL) Take by mouth.      allopurinol (ZYLOPRIM) 100 MG tablet Take 100 mg by mouth once daily.  5    atorvastatin (LIPITOR) 20 MG tablet Take 20 mg by mouth once daily.      clopidogrel (PLAVIX) 75 mg tablet Take 75 mg by mouth once daily.      losartan (COZAAR) 100 MG tablet Take 160 mg by mouth once daily.      multivitamin with minerals tablet Take 1 tablet by mouth once daily.      pantoprazole (PROTONIX) 40 MG tablet Take 40 mg by mouth once daily.      tiZANidine (ZANAFLEX) 4 MG tablet TK 1 T PO Q 8 H AS NEEDED. NTE 3 DOSES IN 24 H  0    aspirin 325 MG tablet Take 325 mg by mouth every evening. (Patient not taking: Reported on 6/14/2024)      traMADoL (ULTRAM) 50 mg tablet Take 1 tablet (50 mg total) by mouth every 8 (eight) hours as needed for Pain. (Patient not taking: Reported on 11/7/2023) 15 tablet 0     No current facility-administered medications on file prior to visit.       Review of patient's allergies indicates:  No Known Allergies  Past Medical History:   Diagnosis Date    Mendez's esophagus     BMI 25.0-25.9,adult     Coronary artery disease     Diverticulitis     Dysphagia     Gout, unspecified     HTN (hypertension)     Hyperlipemia     MI (myocardial infarction) 2001    Mixed hyperlipidemia     Personal history of colonic polyps     Sleep apnea, unspecified     Setting 3       Past Surgical History:   Procedure Laterality Date    ANGIOPLASTY  2001    BACK SURGERY      Lumbar x2    COLONOSCOPY W/ POLYPECTOMY  09/27/2021    CYST REMOVAL      From Back    PROSTATE SURGERY      THORACOTOMY Right 07/13/2023    Right Thoracotomy, plication of the right hemidiaphragm with repiar of diaphragmatic eventration, intercostal block with exparel T6-T7-T8 nerves.    UPPER GASTROINTESTINAL ENDOSCOPY  09/27/2021       Family History   Problem Relation Name Age of Onset    Dementia Mother      Heart attack Father      Breast cancer Sister          In  Remission    No Known Problems Maternal Grandmother      No Known Problems Maternal Grandfather      No Known Problems Paternal Grandmother      No Known Problems Paternal Grandfather      Colon cancer Neg Hx      Crohn's disease Neg Hx      Liver cancer Neg Hx      Liver disease Neg Hx      Rectal cancer Neg Hx      Stomach cancer Neg Hx      Ulcerative colitis Neg Hx         Social History     Socioeconomic History    Marital status:     Number of children: 0   Tobacco Use    Smoking status: Never    Smokeless tobacco: Never    Tobacco comments:     Works in [employer] for 35 years.    Substance and Sexual Activity    Alcohol use: Yes     Comment: occasional    Drug use: Yes     Frequency: 14.0 times per week     Comment: cbd oil twice day     RADIOLOGY REPORT        PT NAME: EDEN COLÓN      [facility] IMAGING     : 1945 M 77             [address]    ACCT: [id number]                                              [facility] Rec #: [id number]                                        [id number]    [facility]             Procedure: CHEST THORAX WO CONT    REQUISITION #: [id number]      REPORT #: [id number]           DATE OF EXAM: 23    TIME OF EXAM: 1430       CHEST THORAX WO CONT        INDICATION: SHORTNESS OF BREATH        Comparison: 2012        TECHNIQUE: Automated exposure control was utilized for a dose reduction.    Noncontrast CT of the chest was performed. Interstitial lung disease   protocol was utilized.        Discussion:        There is mild bibasilar atelectasis or parenchymal scarring. There is no   specific findings of interstitial lung disease. No pulmonary mass or   consolidation is seen.        There is no evidence of mediastinal or hilar lymphadenopathy. The trachea   and major bronchi are patent. There is no evidence of pneumothorax or   pleural effusion. There is no suspicious bone lesion.        IMPRESSION:        No acute  or suspicious abnormalities. No specific findings of chronic   interstitial lung disease.                        DICTATING PHYSICIAN:  KAYLA GILLIAM MD                   Date Dictated: 06/02/23 1805        Signed By:  KAYLA GILLIAM MD <Electronically signed by KAYLA GILLIAM MD   in OV>    Date Signed:  06/02/23 1808     CC: THERESE GALDAMEZ MD ; THERESE GALDAMEZ MD      ADMITTING PHYSICIAN:                                                                                                    ORDERING PHY: THERESE GALDAMEZ MD                                                                                                                                                      ATTENDING PHY: THERESE GALDAMEZ MD    Patient Status:  REG CLI    Admit Service Date: 06/02/23                Specimen Collected: 06/02/23 18:05 Last Resulted: 06/02/23 18:11             Review of Systems   Constitutional: Negative.  Negative for fever and weight loss.   HENT: Negative.  Negative for congestion, hearing loss and sore throat.    Eyes: Negative.  Negative for blurred vision and double vision.   Respiratory:  Positive for shortness of breath. Negative for cough.         Shortness of breath improving   Cardiovascular: Negative.  Negative for chest pain, orthopnea and leg swelling.   Gastrointestinal: Negative.  Negative for abdominal pain, blood in stool, constipation, diarrhea, heartburn, melena, nausea and vomiting.   Genitourinary: Negative.  Negative for dysuria, frequency and urgency.   Musculoskeletal: Negative.  Negative for myalgias and neck pain.   Skin: Negative.  Negative for itching and rash.        Denies problems with surgical wound   Neurological: Negative.  Negative for dizziness, tingling, focal weakness and headaches.   Psychiatric/Behavioral: Negative.  Negative for depression.        Objective:   /81 (BP Location: Left arm, Patient Position: Sitting, BP Method: Medium (Automatic))   Pulse 75   Resp 20    "Ht 5' 8" (1.727 m)   Wt 79 kg (174 lb 1.6 oz)   SpO2 95%   BMI 26.47 kg/m²     Physical Exam  Constitutional:       General: He is not in acute distress.     Appearance: Normal appearance. He is normal weight. He is not ill-appearing, toxic-appearing or diaphoretic.   HENT:      Head: Normocephalic and atraumatic.      Nose: Nose normal.      Mouth/Throat:      Mouth: Mucous membranes are moist.   Eyes:      Extraocular Movements: Extraocular movements intact.      Conjunctiva/sclera: Conjunctivae normal.      Pupils: Pupils are equal, round, and reactive to light.   Cardiovascular:      Rate and Rhythm: Normal rate and regular rhythm.      Pulses: Normal pulses.      Heart sounds: Normal heart sounds. No murmur heard.     No gallop.   Pulmonary:      Effort: Pulmonary effort is normal. No respiratory distress.      Breath sounds: Normal breath sounds. No wheezing, rhonchi or rales.   Chest:      Chest wall: No tenderness.   Abdominal:      General: Abdomen is flat. Bowel sounds are normal. There is no distension.      Palpations: Abdomen is soft.      Tenderness: There is no abdominal tenderness. There is no right CVA tenderness or guarding.      Comments: RUQ and RLQ fullness. No tenderness to palpation.    Musculoskeletal:         General: No swelling, tenderness, deformity or signs of injury. Normal range of motion.      Cervical back: Normal range of motion.      Right lower leg: No edema.      Left lower leg: No edema.   Skin:     General: Skin is warm and dry.      Capillary Refill: Capillary refill takes more than 3 seconds.      Findings: No bruising or erythema.      Comments: Surgical incision well approximated with no erythema or drainage. Cleaned some dermabond from the incision   Neurological:      General: No focal deficit present.      Mental Status: He is alert and oriented to person, place, and time. Mental status is at baseline.      Sensory: No sensory deficit.      Motor: No weakness.      " Coordination: Coordination normal.      Gait: Gait normal.   Psychiatric:         Mood and Affect: Mood normal.         Assessment/Plan:     Diaphragmatic Plication    Hx of Right sided hemidiaphragmatic paralysis    CAD    Hx of TURP    Arthritis    Sciatica    Pulm Nodule- no changes on last CT    Plan:  reassurance given to patient. Patient to continue f/u with ent and general surgery. RTC PRN

## 2025-01-31 ENCOUNTER — OUTSIDE PLACE OF SERVICE (OUTPATIENT)
Dept: SURGERY | Facility: CLINIC | Age: 80
End: 2025-01-31
Payer: MEDICARE